# Patient Record
Sex: FEMALE | Race: OTHER | HISPANIC OR LATINO | URBAN - METROPOLITAN AREA
[De-identification: names, ages, dates, MRNs, and addresses within clinical notes are randomized per-mention and may not be internally consistent; named-entity substitution may affect disease eponyms.]

---

## 2023-08-14 RX ORDER — METRONIDAZOLE 500 MG
1 TABLET ORAL
Refills: 0 | DISCHARGE
Start: 2023-08-14

## 2023-08-14 RX ORDER — CIPROFLOXACIN LACTATE 400MG/40ML
1 VIAL (ML) INTRAVENOUS
Refills: 0 | DISCHARGE
Start: 2023-08-14

## 2023-08-15 ENCOUNTER — INPATIENT (INPATIENT)
Facility: HOSPITAL | Age: 58
LOS: 2 days | Discharge: HOME | DRG: 442 | End: 2023-08-18
Attending: INTERNAL MEDICINE | Admitting: INTERNAL MEDICINE
Payer: COMMERCIAL

## 2023-08-15 VITALS
HEART RATE: 91 BPM | RESPIRATION RATE: 18 BRPM | SYSTOLIC BLOOD PRESSURE: 124 MMHG | HEIGHT: 62 IN | WEIGHT: 179.9 LBS | DIASTOLIC BLOOD PRESSURE: 79 MMHG | TEMPERATURE: 98 F | OXYGEN SATURATION: 99 %

## 2023-08-15 DIAGNOSIS — K74.60 UNSPECIFIED CIRRHOSIS OF LIVER: ICD-10-CM

## 2023-08-15 LAB
A1AT SERPL-MCNC: 191 MG/DL — SIGNIFICANT CHANGE UP (ref 90–200)
ALBUMIN SERPL ELPH-MCNC: 3.3 G/DL — SIGNIFICANT CHANGE UP (ref 3.3–5)
ALP SERPL-CCNC: 109 U/L — SIGNIFICANT CHANGE UP (ref 40–120)
ALT FLD-CCNC: 40 U/L — SIGNIFICANT CHANGE UP (ref 10–45)
ANION GAP SERPL CALC-SCNC: 12 MMOL/L — SIGNIFICANT CHANGE UP (ref 5–17)
AST SERPL-CCNC: 56 U/L — HIGH (ref 10–40)
B-OH-BUTYR SERPL-SCNC: 0.1 MMOL/L — SIGNIFICANT CHANGE UP
BASE EXCESS BLDV CALC-SCNC: -5.4 MMOL/L — LOW (ref -2–3)
BASOPHILS # BLD AUTO: 0 K/UL — SIGNIFICANT CHANGE UP (ref 0–0.2)
BASOPHILS NFR BLD AUTO: 0 % — SIGNIFICANT CHANGE UP (ref 0–2)
BILIRUB SERPL-MCNC: 0.9 MG/DL — SIGNIFICANT CHANGE UP (ref 0.2–1.2)
BUN SERPL-MCNC: <4 MG/DL — LOW (ref 7–23)
CA-I SERPL-SCNC: 1.1 MMOL/L — LOW (ref 1.15–1.33)
CALCIUM SERPL-MCNC: 7.7 MG/DL — LOW (ref 8.4–10.5)
CHLORIDE BLDV-SCNC: 105 MMOL/L — SIGNIFICANT CHANGE UP (ref 96–108)
CHLORIDE SERPL-SCNC: 107 MMOL/L — SIGNIFICANT CHANGE UP (ref 96–108)
CHOLEST SERPL-MCNC: 79 MG/DL — SIGNIFICANT CHANGE UP
CK SERPL-CCNC: 197 U/L — HIGH (ref 25–170)
CO2 BLDV-SCNC: 21 MMOL/L — LOW (ref 22–26)
CO2 SERPL-SCNC: 19 MMOL/L — LOW (ref 22–31)
CREAT SERPL-MCNC: 0.43 MG/DL — LOW (ref 0.5–1.3)
EGFR: 111 ML/MIN/1.73M2 — SIGNIFICANT CHANGE UP
EOSINOPHIL # BLD AUTO: 0.07 K/UL — SIGNIFICANT CHANGE UP (ref 0–0.5)
EOSINOPHIL NFR BLD AUTO: 2 % — SIGNIFICANT CHANGE UP (ref 0–6)
FERRITIN SERPL-MCNC: 102 NG/ML — SIGNIFICANT CHANGE UP (ref 13–330)
GAS PNL BLDV: 135 MMOL/L — LOW (ref 136–145)
GAS PNL BLDV: SIGNIFICANT CHANGE UP
GAS PNL BLDV: SIGNIFICANT CHANGE UP
GLUCOSE BLDV-MCNC: 132 MG/DL — HIGH (ref 70–99)
GLUCOSE SERPL-MCNC: 136 MG/DL — HIGH (ref 70–99)
HAV IGM SER-ACNC: SIGNIFICANT CHANGE UP
HBV CORE IGM SER-ACNC: SIGNIFICANT CHANGE UP
HBV SURFACE AG SER-ACNC: SIGNIFICANT CHANGE UP
HCO3 BLDV-SCNC: 20 MMOL/L — LOW (ref 22–29)
HCT VFR BLD CALC: 37.4 % — SIGNIFICANT CHANGE UP (ref 34.5–45)
HCT VFR BLDA CALC: 41 % — SIGNIFICANT CHANGE UP (ref 34.5–46.5)
HCV AB S/CO SERPL IA: 0.19 S/CO — SIGNIFICANT CHANGE UP (ref 0–0.99)
HCV AB SERPL-IMP: SIGNIFICANT CHANGE UP
HDLC SERPL-MCNC: 38 MG/DL — LOW
HGB BLD CALC-MCNC: 13.6 G/DL — SIGNIFICANT CHANGE UP (ref 11.7–16.1)
HGB BLD-MCNC: 12.2 G/DL — SIGNIFICANT CHANGE UP (ref 11.5–15.5)
IRON SATN MFR SERPL: 26 % — SIGNIFICANT CHANGE UP (ref 14–50)
IRON SATN MFR SERPL: 77 UG/DL — SIGNIFICANT CHANGE UP (ref 30–160)
LACTATE BLDV-MCNC: 2.9 MMOL/L — HIGH (ref 0.5–2)
LACTATE SERPL-SCNC: 1.5 MMOL/L — SIGNIFICANT CHANGE UP (ref 0.5–2)
LIDOCAIN IGE QN: 28 U/L — SIGNIFICANT CHANGE UP (ref 7–60)
LIPID PNL WITH DIRECT LDL SERPL: 27 MG/DL — SIGNIFICANT CHANGE UP
LYMPHOCYTES # BLD AUTO: 1.41 K/UL — SIGNIFICANT CHANGE UP (ref 1–3.3)
LYMPHOCYTES # BLD AUTO: 43 % — SIGNIFICANT CHANGE UP (ref 13–44)
MANUAL SMEAR VERIFICATION: SIGNIFICANT CHANGE UP
MCHC RBC-ENTMCNC: 29 PG — SIGNIFICANT CHANGE UP (ref 27–34)
MCHC RBC-ENTMCNC: 32.6 GM/DL — SIGNIFICANT CHANGE UP (ref 32–36)
MCV RBC AUTO: 89 FL — SIGNIFICANT CHANGE UP (ref 80–100)
MONOCYTES # BLD AUTO: 0.23 K/UL — SIGNIFICANT CHANGE UP (ref 0–0.9)
MONOCYTES NFR BLD AUTO: 7 % — SIGNIFICANT CHANGE UP (ref 2–14)
NEUTROPHILS # BLD AUTO: 1.58 K/UL — LOW (ref 1.8–7.4)
NEUTROPHILS NFR BLD AUTO: 47 % — SIGNIFICANT CHANGE UP (ref 43–77)
NEUTS BAND # BLD: 1 % — SIGNIFICANT CHANGE UP (ref 0–8)
NON HDL CHOLESTEROL: 40 MG/DL — SIGNIFICANT CHANGE UP
NRBC # BLD: 0 /100 — SIGNIFICANT CHANGE UP (ref 0–0)
PCO2 BLDV: 38 MMHG — LOW (ref 39–42)
PH BLDV: 7.33 — SIGNIFICANT CHANGE UP (ref 7.32–7.43)
PLAT MORPH BLD: NORMAL — SIGNIFICANT CHANGE UP
PLATELET # BLD AUTO: 56 K/UL — LOW (ref 150–400)
PO2 BLDV: 21 MMHG — LOW (ref 25–45)
POTASSIUM BLDV-SCNC: 3.1 MMOL/L — LOW (ref 3.5–5.1)
POTASSIUM SERPL-MCNC: 3.1 MMOL/L — LOW (ref 3.5–5.3)
POTASSIUM SERPL-SCNC: 3.1 MMOL/L — LOW (ref 3.5–5.3)
PROT SERPL-MCNC: 6.1 G/DL — SIGNIFICANT CHANGE UP (ref 6–8.3)
PROT SERPL-MCNC: 6.1 G/DL — SIGNIFICANT CHANGE UP (ref 6–8.3)
PROT SERPL-MCNC: 6.3 G/DL — SIGNIFICANT CHANGE UP (ref 6–8.3)
RBC # BLD: 4.2 M/UL — SIGNIFICANT CHANGE UP (ref 3.8–5.2)
RBC # FLD: 13.7 % — SIGNIFICANT CHANGE UP (ref 10.3–14.5)
RBC BLD AUTO: SIGNIFICANT CHANGE UP
SAO2 % BLDV: 45.4 % — LOW (ref 67–88)
SODIUM SERPL-SCNC: 138 MMOL/L — SIGNIFICANT CHANGE UP (ref 135–145)
TIBC SERPL-MCNC: 291 UG/DL — SIGNIFICANT CHANGE UP (ref 220–430)
TRANSFERRIN SERPL-MCNC: 226 MG/DL — SIGNIFICANT CHANGE UP (ref 200–360)
TRIGL SERPL-MCNC: 55 MG/DL — SIGNIFICANT CHANGE UP
UIBC SERPL-MCNC: 215 UG/DL — SIGNIFICANT CHANGE UP (ref 110–370)
WBC # BLD: 3.29 K/UL — LOW (ref 3.8–10.5)
WBC # FLD AUTO: 3.29 K/UL — LOW (ref 3.8–10.5)

## 2023-08-15 PROCEDURE — 99285 EMERGENCY DEPT VISIT HI MDM: CPT

## 2023-08-15 PROCEDURE — 71045 X-RAY EXAM CHEST 1 VIEW: CPT | Mod: 26

## 2023-08-15 RX ORDER — SODIUM CHLORIDE 9 MG/ML
1000 INJECTION INTRAMUSCULAR; INTRAVENOUS; SUBCUTANEOUS ONCE
Refills: 0 | Status: COMPLETED | OUTPATIENT
Start: 2023-08-15 | End: 2023-08-15

## 2023-08-15 RX ORDER — CIPROFLOXACIN LACTATE 400MG/40ML
400 VIAL (ML) INTRAVENOUS EVERY 12 HOURS
Refills: 0 | Status: DISCONTINUED | OUTPATIENT
Start: 2023-08-15 | End: 2023-08-18

## 2023-08-15 RX ORDER — DENOSUMAB 60 MG/ML
60 INJECTION SUBCUTANEOUS
Refills: 0 | DISCHARGE

## 2023-08-15 RX ORDER — ALBUTEROL 90 UG/1
2 AEROSOL, METERED ORAL
Refills: 0 | DISCHARGE

## 2023-08-15 RX ORDER — POTASSIUM CHLORIDE 20 MEQ
40 PACKET (EA) ORAL ONCE
Refills: 0 | Status: COMPLETED | OUTPATIENT
Start: 2023-08-15 | End: 2023-08-15

## 2023-08-15 RX ORDER — METRONIDAZOLE 500 MG
500 TABLET ORAL EVERY 8 HOURS
Refills: 0 | Status: DISCONTINUED | OUTPATIENT
Start: 2023-08-15 | End: 2023-08-18

## 2023-08-15 RX ORDER — DULAGLUTIDE 4.5 MG/.5ML
1.5 INJECTION, SOLUTION SUBCUTANEOUS
Refills: 0 | DISCHARGE

## 2023-08-15 RX ORDER — LOPERAMIDE HCL 2 MG
2 TABLET ORAL ONCE
Refills: 0 | Status: COMPLETED | OUTPATIENT
Start: 2023-08-15 | End: 2023-08-15

## 2023-08-15 RX ORDER — METFORMIN HYDROCHLORIDE 850 MG/1
1 TABLET ORAL
Refills: 0 | DISCHARGE

## 2023-08-15 RX ADMIN — Medication 2 MILLIGRAM(S): at 13:57

## 2023-08-15 RX ADMIN — Medication 40 MILLIEQUIVALENT(S): at 15:35

## 2023-08-15 RX ADMIN — SODIUM CHLORIDE 1000 MILLILITER(S): 9 INJECTION INTRAMUSCULAR; INTRAVENOUS; SUBCUTANEOUS at 13:57

## 2023-08-15 RX ADMIN — Medication 100 MILLIGRAM(S): at 23:00

## 2023-08-15 NOTE — ED ADULT NURSE REASSESSMENT NOTE - NS ED NURSE REASSESS COMMENT FT1
Unable to obtain peripheral IV, MD to place US guided IV Unable to obtain peripheral IV, US team to place US guided IV

## 2023-08-15 NOTE — ED PROVIDER NOTE - OBJECTIVE STATEMENT
58-year-old female history of diabetes presenting to the emergency department with 4 days of continuous watery diarrhea.  On day 2 of her symptoms patient was seen at an outside hospital was brought into observation for dehydration and electrolyte abnormalities at that time had a CT of the abdomen pelvis performed showing diffuse colitis and was started on Cipro Flagyl.  Patient is only been on Cipro Flagyl for 24 hours without improvement of her symptoms.  Was seen by gastrointestinal specialist yesterday that recommended that she get a myriad of blood work done which she has not performed yet.  Patient presenting to our hospital today due to persistent symptoms reports 8 bowel movements today that were all diarrhea nonbloody nonbilious.  No fevers no chills no nausea no vomiting.  No chest pain or shortness of breath.  Nothing makes her complaints better or worse has not taking anything in particular to stop the diarrhea. 58-year-old female history of diabetes presenting to the emergency department with 4 days of continuous watery diarrhea.  On day 2 of her symptoms patient was seen at an outside hospital was brought into observation for dehydration and electrolyte abnormalities at that time had a CT of the abdomen pelvis performed showing diffuse colitis and was started on Cipro Flagyl.  Patient is only been on Cipro Flagyl for 24 hours without improvement of her symptoms.  Was seen by gastrointestinal specialist yesterday that recommended that she get a myriad of blood work done which she has not performed yet.  Patient presenting to our hospital today due to persistent symptoms reports 8 bowel movements today that were all diarrhea nonbloody nonbilious.  No fevers no chills no nausea no vomiting.  No chest pain or shortness of breath.  Nothing makes her complaints better or worse has not taking anything in particular to stop the diarrhea. Of note, mother had cirrhosis. Pt with negative C.Diff, negative COVID at OSH.

## 2023-08-15 NOTE — ED ADULT TRIAGE NOTE - AS PAIN REST
CERTIFICATE OF RETURN TO WORK        Re: Kehinde Parish        This is to certify that Kehinde Parish has been under my care for injury/illness.      RESTRICTIONS:  Seen today.  Off work from Tuesday 6/20 until reevaluated Friday 6/23/17;      REMARKS:   Return sooner if worse or no improvement.         SIGNATURE:___________________________________________,   6/21/2017                                               ESTHER Hutchins.         [facility]  [address]  [place] [address]  Phone [phone]      
0 (no pain/absence of nonverbal indicators of pain)

## 2023-08-15 NOTE — ED PROVIDER NOTE - CLINICAL SUMMARY MEDICAL DECISION MAKING FREE TEXT BOX
On physical exam patient is well-appearing with dry mucous membranes and a soft nontender abdomen that is slightly distended.  No other findings at this time.  Patient accompanied by paperwork from her GI doctor asking for particular lab work, space has the CT from over the weekend when she got her abdomen scanned which reads as having severe portal hypertension cirrhosis and diffuse colitis.  We will check basic labs we will send esoteric labs from the gastrointestinal specialist we will hydrate give something to help with diarrhea and reassess with plan for likely admission to be seen by hepatology to assess for the cirrhosis and portal hypertension. On physical exam patient is well-appearing with dry mucous membranes and a soft nontender abdomen that is slightly distended.  No other findings at this time.  Patient accompanied by paperwork from her GI doctor asking for particular lab work, space has the CT from over the weekend when she got her abdomen scanned which reads as having severe portal hypertension cirrhosis and diffuse colitis.  We will check basic labs we will send esoteric labs from the gastrointestinal specialist we will hydrate give something to help with diarrhea and reassess with plan for likely admission to be seen by hepatology to assess for the cirrhosis and portal hypertension.    Meliza Orr, Attending Physician: DDx includes but not limited to: ascites, cirrhosis, liver failure, no clinical evidence of SBP. 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

## 2023-08-15 NOTE — H&P ADULT - NSHPPHYSICALEXAM_GEN_ALL_CORE
Vital Signs Last 24 Hrs  T(C): 37.3 (15 Aug 2023 20:14), Max: 37.3 (15 Aug 2023 20:14)  T(F): 99.2 (15 Aug 2023 20:14), Max: 99.2 (15 Aug 2023 20:14)  HR: 89 (15 Aug 2023 20:14) (78 - 92)  BP: 102/69 (15 Aug 2023 20:14) (102/69 - 135/81)  BP(mean): --  RR: 17 (15 Aug 2023 20:14) (16 - 18)  SpO2: 97% (15 Aug 2023 20:14) (97% - 99%)    Parameters below as of 15 Aug 2023 20:14  Patient On (Oxygen Delivery Method): room air    PHYSICAL EXAM:  GENERAL: NAD, well-developed  HEAD:  Atraumatic, Normocephalic  EYES: EOMI, PERRLA, conjunctiva and sclera clear  NECK: Supple, No JVD  CHEST/LUNG: Clear to auscultation bilaterally; No wheeze  HEART: Regular rate and rhythm; No murmurs, rubs, or gallops  ABDOMEN: Soft, Nontender, Nondistended; Bowel sounds present  EXTREMITIES:  2+ Peripheral Pulses, No clubbing, cyanosis, or edema  PSYCH: AAOx3  NEUROLOGY: non-focal  SKIN: No rashes or lesions

## 2023-08-15 NOTE — H&P ADULT - HISTORY OF PRESENT ILLNESS
58-year-old female history of diabetes presenting to the emergency department with 4 days of continuous watery diarrhea.  On day 2 of her symptoms patient was seen at an outside hospital was brought into observation for dehydration and electrolyte abnormalities at that time had a CT of the abdomen pelvis performed showing diffuse colitis and was started on Cipro Flagyl.  Patient is only been on Cipro Flagyl for 24 hours without improvement of her symptoms.  Was seen by gastrointestinal specialist yesterday that recommended that she get a myriad of blood work done which she has not performed yet.  Patient presenting to our hospital today due to persistent symptoms reports 8 bowel movements today that were all diarrhea nonbloody nonbilious.  No fevers no chills no nausea no vomiting.  No chest pain or shortness of breath.  Nothing makes her complaints better or worse has not taking anything in particular to stop the diarrhea. Of note, mother had cirrhosis. Pt with negative C.Diff, negative COVID at OSH.

## 2023-08-15 NOTE — ED ADULT NURSE NOTE - NSFALLUNIVINTERV_ED_ALL_ED
Bed/Stretcher in lowest position, wheels locked, appropriate side rails in place/Call bell, personal items and telephone in reach/Instruct patient to call for assistance before getting out of bed/chair/stretcher/Non-slip footwear applied when patient is off stretcher/[place] to call system/Physically safe environment - no spills, clutter or unnecessary equipment/Purposeful proactive rounding/Room/bathroom lighting operational, light cord in reach

## 2023-08-15 NOTE — ED ADULT NURSE NOTE - OBJECTIVE STATEMENT
57 yo female with pmh cirrhosis, diabetes presents to ED 59 yo female with pmh cirrhosis, diabetes presents to ED c/o abdominal cramping and diarrhea x 4 days. Pt was seen at OSH on Saturday, and started on abx for colitis yesterday. Pt reports she f/u with GI yesterday and was advised to get additional blood work; came to ED today for continued diarrhea, endorsing "8 episodes" today. Pt also reports "I was told I have ascites when I was at the hospital over the weekend." Pt denies bloody/bilious diarrhea, fevers/chills, n/v, cp, sob, urinary symptoms. Pt a&ox3, breathing spontaneous and unlabored, moving all extremities and following commands, skin warm dry and appropriate color, abdomen mildly distended. Pt safety measures in place and comfort provided.

## 2023-08-15 NOTE — ED PROVIDER NOTE - NSICDXFAMILYHX_GEN_ALL_CORE_FT
FAMILY HISTORY:  Father  Still living? No  Family history of kidney cancer, Age at diagnosis: Age Unknown    Mother  Still living? Yes, Estimated age: Age Unknown  Family history of diabetes mellitus, Age at diagnosis: Age Unknown  Family history of hypertension, Age at diagnosis: Age Unknown  Family history of liver disease, Age at diagnosis: Age Unknown

## 2023-08-15 NOTE — H&P ADULT - ASSESSMENT
58 female h/o dm, here with c/o diarrhea. reported colitis on CT from OSH.    diarrhea  colitis  check stool pcr, cdiff  cipro/flagyl iv   clears diet  gi consulted  consider repeat CT    thrombocytopenia  unknown etiology  chronic as per pt  monitor    dm  iss  monitor fs    hypokalemia  supplemented  check repeat bmp        Advanced care planning was discussed with patient and family.  Advanced care planning forms were reviewed and discussed as appropriate.  Differential diagnosis and plan of care discussed with patient after the evaluation.   Pain assessed and judicious use of narcotics when appropriate was discussed.  Importance of Fall prevention discussed.  Counseling on Smoking and Alcohol cessation was offered when appropriate.  Counseling on Diet, exercise, and medication compliance was done.       Approx 60 minutes spent.

## 2023-08-15 NOTE — ED PROCEDURE NOTE - PROCEDURE ADDITIONAL DETAILS
Emergency Department Focused Ultrasound performed at patient's bedside for placement of ultrasound guided IV. The study was confirmed with blood return and ease of flushing saline.

## 2023-08-16 LAB
ALBUMIN SERPL ELPH-MCNC: 2.7 G/DL — LOW (ref 3.3–5)
ALP SERPL-CCNC: 94 U/L — SIGNIFICANT CHANGE UP (ref 40–120)
ALT FLD-CCNC: 36 U/L — SIGNIFICANT CHANGE UP (ref 10–45)
ANION GAP SERPL CALC-SCNC: 11 MMOL/L — SIGNIFICANT CHANGE UP (ref 5–17)
APTT BLD: 34.4 SEC — SIGNIFICANT CHANGE UP (ref 24.5–35.6)
AST SERPL-CCNC: 51 U/L — HIGH (ref 10–40)
BILIRUB SERPL-MCNC: 0.8 MG/DL — SIGNIFICANT CHANGE UP (ref 0.2–1.2)
BUN SERPL-MCNC: <4 MG/DL — LOW (ref 7–23)
CALCIUM SERPL-MCNC: 7.3 MG/DL — LOW (ref 8.4–10.5)
CHLORIDE SERPL-SCNC: 109 MMOL/L — HIGH (ref 96–108)
CO2 SERPL-SCNC: 20 MMOL/L — LOW (ref 22–31)
CREAT SERPL-MCNC: 0.38 MG/DL — LOW (ref 0.5–1.3)
EGFR: 115 ML/MIN/1.73M2 — SIGNIFICANT CHANGE UP
EPEC DNA STL QL NAA+PROBE: DETECTED
G LAMBLIA DNA STL QL NAA+NON-PROBE: DETECTED
GI PCR PANEL: DETECTED
GLUCOSE BLDC GLUCOMTR-MCNC: 112 MG/DL — HIGH (ref 70–99)
GLUCOSE BLDC GLUCOMTR-MCNC: 114 MG/DL — HIGH (ref 70–99)
GLUCOSE BLDC GLUCOMTR-MCNC: 121 MG/DL — HIGH (ref 70–99)
GLUCOSE BLDC GLUCOMTR-MCNC: 170 MG/DL — HIGH (ref 70–99)
GLUCOSE SERPL-MCNC: 186 MG/DL — HIGH (ref 70–99)
HCT VFR BLD CALC: 34.9 % — SIGNIFICANT CHANGE UP (ref 34.5–45)
HGB BLD-MCNC: 11.5 G/DL — SIGNIFICANT CHANGE UP (ref 11.5–15.5)
INR BLD: 1.65 RATIO — HIGH (ref 0.85–1.18)
MCHC RBC-ENTMCNC: 29 PG — SIGNIFICANT CHANGE UP (ref 27–34)
MCHC RBC-ENTMCNC: 33 GM/DL — SIGNIFICANT CHANGE UP (ref 32–36)
MCV RBC AUTO: 87.9 FL — SIGNIFICANT CHANGE UP (ref 80–100)
MITOCHONDRIA AB SER-ACNC: SIGNIFICANT CHANGE UP
NRBC # BLD: 0 /100 WBCS — SIGNIFICANT CHANGE UP (ref 0–0)
PLATELET # BLD AUTO: 55 K/UL — LOW (ref 150–400)
POTASSIUM SERPL-MCNC: 3.5 MMOL/L — SIGNIFICANT CHANGE UP (ref 3.5–5.3)
POTASSIUM SERPL-SCNC: 3.5 MMOL/L — SIGNIFICANT CHANGE UP (ref 3.5–5.3)
PROT SERPL-MCNC: 5.5 G/DL — LOW (ref 6–8.3)
PROTHROM AB SERPL-ACNC: 17.1 SEC — HIGH (ref 9.5–13)
RBC # BLD: 3.97 M/UL — SIGNIFICANT CHANGE UP (ref 3.8–5.2)
RBC # FLD: 13.8 % — SIGNIFICANT CHANGE UP (ref 10.3–14.5)
SMOOTH MUSCLE AB SER-ACNC: SIGNIFICANT CHANGE UP
SODIUM SERPL-SCNC: 140 MMOL/L — SIGNIFICANT CHANGE UP (ref 135–145)
WBC # BLD: 2.8 K/UL — LOW (ref 3.8–10.5)
WBC # FLD AUTO: 2.8 K/UL — LOW (ref 3.8–10.5)

## 2023-08-16 PROCEDURE — 99222 1ST HOSP IP/OBS MODERATE 55: CPT

## 2023-08-16 PROCEDURE — 76705 ECHO EXAM OF ABDOMEN: CPT | Mod: 26

## 2023-08-16 RX ORDER — FUROSEMIDE 40 MG
40 TABLET ORAL DAILY
Refills: 0 | Status: DISCONTINUED | OUTPATIENT
Start: 2023-08-16 | End: 2023-08-18

## 2023-08-16 RX ORDER — DEXTROSE 50 % IN WATER 50 %
25 SYRINGE (ML) INTRAVENOUS ONCE
Refills: 0 | Status: DISCONTINUED | OUTPATIENT
Start: 2023-08-16 | End: 2023-08-18

## 2023-08-16 RX ORDER — DEXTROSE 50 % IN WATER 50 %
12.5 SYRINGE (ML) INTRAVENOUS ONCE
Refills: 0 | Status: DISCONTINUED | OUTPATIENT
Start: 2023-08-16 | End: 2023-08-18

## 2023-08-16 RX ORDER — DEXTROSE 50 % IN WATER 50 %
15 SYRINGE (ML) INTRAVENOUS ONCE
Refills: 0 | Status: DISCONTINUED | OUTPATIENT
Start: 2023-08-16 | End: 2023-08-18

## 2023-08-16 RX ORDER — INSULIN LISPRO 100/ML
VIAL (ML) SUBCUTANEOUS AT BEDTIME
Refills: 0 | Status: DISCONTINUED | OUTPATIENT
Start: 2023-08-16 | End: 2023-08-18

## 2023-08-16 RX ORDER — GLUCAGON INJECTION, SOLUTION 0.5 MG/.1ML
1 INJECTION, SOLUTION SUBCUTANEOUS ONCE
Refills: 0 | Status: DISCONTINUED | OUTPATIENT
Start: 2023-08-16 | End: 2023-08-18

## 2023-08-16 RX ORDER — SODIUM CHLORIDE 9 MG/ML
1000 INJECTION, SOLUTION INTRAVENOUS
Refills: 0 | Status: DISCONTINUED | OUTPATIENT
Start: 2023-08-16 | End: 2023-08-18

## 2023-08-16 RX ORDER — SPIRONOLACTONE 25 MG/1
100 TABLET, FILM COATED ORAL DAILY
Refills: 0 | Status: DISCONTINUED | OUTPATIENT
Start: 2023-08-16 | End: 2023-08-18

## 2023-08-16 RX ORDER — INSULIN LISPRO 100/ML
VIAL (ML) SUBCUTANEOUS
Refills: 0 | Status: DISCONTINUED | OUTPATIENT
Start: 2023-08-16 | End: 2023-08-18

## 2023-08-16 RX ADMIN — Medication 100 MILLIGRAM(S): at 21:28

## 2023-08-16 RX ADMIN — SPIRONOLACTONE 100 MILLIGRAM(S): 25 TABLET, FILM COATED ORAL at 18:53

## 2023-08-16 RX ADMIN — Medication 200 MILLIGRAM(S): at 07:01

## 2023-08-16 RX ADMIN — Medication 40 MILLIGRAM(S): at 18:53

## 2023-08-16 RX ADMIN — Medication 100 MILLIGRAM(S): at 05:52

## 2023-08-16 RX ADMIN — Medication 1: at 12:03

## 2023-08-16 RX ADMIN — Medication 200 MILLIGRAM(S): at 18:07

## 2023-08-16 RX ADMIN — Medication 100 MILLIGRAM(S): at 14:38

## 2023-08-16 NOTE — CONSULT NOTE ADULT - ASSESSMENT
59yo w/ PMhx DMII presenting w/ watery diarrhea and ascites, most likely 2/2 MADDOX cirrhosis.    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 Iron studies unremarkable, Alpha 1 antitrypsin wnl, hepatitis panel wnl.   V: e/o large ascites on exam   I: Needs tap to r/o SBP  B: Needs screening EGD prior to d/c  E: No e/o HE on exam  S: F/u outpt    #diarrhea  based on hx, suspect secretory (nightime sxs). Reporteldy had stool studies outpt but unclear if pcr done. Improving w/ imodium    Recommendations:  -Please obtain abdominal U/S w/ doppler to r/o PVT and evaluate for ascites  -Please perform paracentesis- please send fluid cell count, fluid cytology, fluid albumin and fluid protein   -Please send SY, anti-SM Ab, Anti-LKM, immunoglobulin panel  -Please start on aldactone 100mg daily and lasix 40mg daily   -Please stop outpt valsartan  -Monitor electrolytes   -Please put on 2g Na restriction  -Would resend GI PCR    Hepatology will continue to follow    Note incomplete until finalized by attending signature/attestation.    Marjan Maldonado  GI/Hepatology Fellow PGY5    NON-URGENT CONSULTS:  Please email [email or web address]  AT NIGHT AND ON WEEKENDS:  Available on Microsoft Teams  [phone] (Long Range Pager)    After 5pm, please contact the on-call GI fellow. [phone]   59yo w/ PMhx DMII presenting w/ watery diarrhea and ascites, most likely 2/2 MADDOX cirrhosis.    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 Iron studies unremarkable, Alpha 1 antitrypsin wnl, hepatitis panel wnl.   V: e/o large ascites on exam   I: Needs tap to r/o SBP  B: Needs screening EGD prior to d/c  E: No e/o HE on exam  S: F/u outpt    #diarrhea  based on hx, suspect secretory (nightime sxs). Reporteldy had stool studies outpt but unclear if pcr done. Improving w/ imodium    Recommendations:  -Please obtain abdominal U/S w/ doppler to r/o PVT and evaluate for ascites  -Please perform paracentesis- please send fluid cell count, fluid cytology, fluid albumin and fluid protein   -please replete albumin 6-8g/kg for every liter of fluid removed after 5L  -Please send SY, anti-SM Ab, Anti-LKM, immunoglobulin panel  -Please start on aldactone 100mg daily and lasix 40mg daily   -Please stop outpt valsartan  -Monitor electrolytes   -Please put on 2g Na restriction  -Would resend GI PCR    Hepatology will continue to follow    Note incomplete until finalized by attending signature/attestation.    Marjan Maldonado  GI/Hepatology Fellow PGY5    NON-URGENT CONSULTS:  Please email [email or web address] OR [email or web address]  AT NIGHT AND ON WEEKENDS:  Available on [employer] Teams  [phone] (Long Range Pager)    After 5pm, please contact the on-call GI fellow. [phone]   59yo w/ PMhx DMII presenting w/ watery diarrhea and ascites, most likely 2/2 MADDOX cirrhosis.    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 Iron studies unremarkable, Alpha 1 antitrypsin wnl, hepatitis panel wnl.   V: e/o large ascites on exam   I: Needs tap to r/o SBP  B: Needs screening EGD prior to d/c  E: No e/o HE on exam  S: F/u outpt    #diarrhea  based on hx, suspect secretory (nightime sxs). Reporteldy had stool studies outpt but unclear if pcr done. Improving w/ imodium    Recommendations:  -Please obtain abdominal U/S w/ doppler to r/o PVT and evaluate for ascites  -Please perform paracentesis- please send fluid cell count, fluid cytology, fluid albumin and fluid protein   -please replete albumin 6-8g/L of fluid removed after 5L   -Please send SY, anti-SM Ab, Anti-LKM, immunoglobulin panel  -Please start on aldactone 100mg daily and lasix 40mg daily   -Please stop outpt valsartan  -Monitor electrolytes   -Please put on 2g Na restriction  -Would resend GI PCR    Hepatology will continue to follow    Note incomplete until finalized by attending signature/attestation.    Marjan Maldonado  GI/Hepatology Fellow PGY5    NON-URGENT CONSULTS:  Please email [email or web address]  AT NIGHT AND ON WEEKENDS:  Available on [employer] Teams  [phone] (Long Range Pager)    After 5pm, please contact the on-call GI fellow. [phone]

## 2023-08-16 NOTE — CHART NOTE - NSCHARTNOTEFT_GEN_A_CORE
IR consulted for diagnostic Paracentesis. Will plan for Paracentesis tomorrow (8/17/23). Please place IR procedure order

## 2023-08-16 NOTE — CONSULT NOTE ADULT - ATTENDING COMMENTS
59 yo F  with obesity, DM2, and a family history of cryptogenic (likely steatohepatitis) cirrhosis and HCC (mother) and with a known personal history for several years of compensated cirrhosis attributed to metabolic steatohepatitis, with recent travel to [place] and subsequent development of acute diarrhea now found to be positive for EPEC and Giardia lamblia on GI PCR (8/16), admitted with recent onset of large volume ascites as well as mild peripheral edema. Outside CT from 8/12 personally reviewed and portomesenteric venous system appears patent, but recommend complete abdominal US with Doppler for re-evaluation here. Awaiting diagnostic and therapeutic paracentesis to be done ASAP to rule out peritonitis (especially given her GI infection) as well as to confirm that ascitic fluid chemistries are consistent with ascites due to sinusoidal portal hypertension from cirrhosis. Given improved diarrhea (though would hold off on any further Imodium), recommend to start diuretics today as above to decrease reaccumulation of ascites. Current MELD 3.0 of 13. We discussed with her and her daughter at bedside as well as with her son (who is a neonatologist) by phone her plan of care, including need for salt restricted diet. We also discussed potential consideration of elective TIPS and/or liver transplantation in the future if she develops refractory ascites not alleviated with diuretics.    Please don't hesitate to call with any questions or concerns.    Baudilio Christine M.D., Ph.D.  Transplant Hepatology

## 2023-08-16 NOTE — PROGRESS NOTE ADULT - SUBJECTIVE AND OBJECTIVE BOX
ARCHIE REZA  58y Female  MRN:[id number]    Patient is a 58y old  Female who presents with a chief complaint of   HPI:  58-year-old female history of diabetes presenting to the emergency department with 4 days of continuous watery diarrhea.  On day 2 of her symptoms patient was seen at an outside hospital was brought into observation for dehydration and electrolyte abnormalities at that time had a CT of the abdomen pelvis performed showing diffuse colitis and was started on Cipro Flagyl.  Patient is only been on Cipro Flagyl for 24 hours without improvement of her symptoms.  Was seen by gastrointestinal specialist yesterday that recommended that she get a myriad of blood work done which she has not performed yet.  Patient presenting to our hospital today due to persistent symptoms reports 8 bowel movements today that were all diarrhea nonbloody nonbilious.  No fevers no chills no nausea no vomiting.  No chest pain or shortness of breath.  Nothing makes her complaints better or worse has not taking anything in particular to stop the diarrhea. Of note, mother had cirrhosis. Pt with negative C.Diff, negative COVID at OSH. (15 Aug 2023 20:26)      Patient seen and evaluated at bedside. No acute events overnight except as noted.    Interval HPI: no acute events o/n     PAST MEDICAL & SURGICAL HISTORY:  Diabetes      Asthma      S/P appendectomy        S/P cholecystectomy        S/P   ,          REVIEW OF SYSTEMS:  as per      VITALS:  Vital Signs Last 24 Hrs  T(C): 36.6 (16 Aug 2023 04:16), Max: 37.3 (15 Aug 2023 20:14)  T(F): 97.9 (16 Aug 2023 04:16), Max: 99.2 (15 Aug 2023 20:14)  HR: 80 (16 Aug 2023 04:16) (78 - 92)  BP: 95/62 (16 Aug 2023 04:16) (95/62 - 135/81)  BP(mean): --  RR: 18 (16 Aug 2023 04:16) (16 - 18)  SpO2: 100% (16 Aug 2023 04:16) (97% - 100%)    Parameters below as of 16 Aug 2023 04:16  Patient On (Oxygen Delivery Method): room air      CAPILLARY BLOOD GLUCOSE      POCT Blood Glucose.: 170 mg/dL (16 Aug 2023 11:32)  POCT Blood Glucose.: 112 mg/dL (16 Aug 2023 07:38)    I&O's Summary      PHYSICAL EXAM:  GENERAL: NAD, well-developed  HEAD:  Atraumatic, Normocephalic  EYES: EOMI, PERRLA, conjunctiva and sclera clear  NECK: Supple, No JVD  CHEST/LUNG: Clear to auscultation bilaterally; No wheeze  HEART: S1, S2; No murmurs, rubs, or gallops  ABDOMEN: Soft, Nontender, +distended; Bowel sounds present  EXTREMITIES:  2+ Peripheral Pulses, No clubbing, cyanosis, or edema  PSYCH: Normal affect  NEUROLOGY: AAOX3; non-focal  SKIN: No rashes or lesions    Consultant(s) Notes Reviewed:  [x ] YES  [ ] NO  Care Discussed with Consultants/Other Providers [ x] YES  [ ] NO    MEDS:  MEDICATIONS  (STANDING):  ciprofloxacin   IVPB 400 milliGRAM(s) IV Intermittent every 12 hours  dextrose 5%. 1000 milliLiter(s) (50 mL/Hr) IV Continuous <Continuous>  dextrose 5%. 1000 milliLiter(s) (100 mL/Hr) IV Continuous <Continuous>  dextrose 50% Injectable 25 Gram(s) IV Push once  dextrose 50% Injectable 12.5 Gram(s) IV Push once  dextrose 50% Injectable 25 Gram(s) IV Push once  glucagon  Injectable 1 milliGRAM(s) IntraMuscular once  insulin lispro (ADMELOG) corrective regimen sliding scale   SubCutaneous three times a day before meals  insulin lispro (ADMELOG) corrective regimen sliding scale   SubCutaneous at bedtime  metroNIDAZOLE  IVPB 500 milliGRAM(s) IV Intermittent every 8 hours    MEDICATIONS  (PRN):  dextrose Oral Gel 15 Gram(s) Oral once PRN Blood Glucose LESS THAN 70 milliGRAM(s)/deciliter    ALLERGIES:  latex (Rash)  eggplant, sesame seeds (Stomach Upset; Anaphylaxis)  Augmentin ES-600 (Nausea; Diarrhea)      LABS:                        11.5   2.80  )-----------( 55       ( 16 Aug 2023 10:41 )             34.9     08-16    140  |  109<H>  |  <4<L>  ----------------------------<  186<H>  3.5   |  20<L>  |  0.38<L>    Ca    7.3<L>      16 Aug 2023 10:41    TPro  5.5<L>  /  Alb  2.7<L>  /  TBili  0.8  /  DBili  x   /  AST  51<H>  /  ALT  36  /  AlkPhos  94  08-16      CARDIAC MARKERS ( 15 Aug 2023 14:11 )  x     / x     / 197 U/L / x     / x          LIVER FUNCTIONS - ( 16 Aug 2023 10:41 )  Alb: 2.7 g/dL / Pro: 5.5 g/dL / ALK PHOS: 94 U/L / ALT: 36 U/L / AST: 51 U/L / GGT: x           Urinalysis Basic - ( 16 Aug 2023 10:41 )    Color: x / Appearance: x / SG: x / pH: x  Gluc: 186 mg/dL / Ketone: x  / Bili: x / Urobili: x   Blood: x / Protein: x / Nitrite: x   Leuk Esterase: x / RBC: x / WBC x   Sq Epi: x / Non Sq Epi: x / Bacteria: x

## 2023-08-16 NOTE — CONSULT NOTE ADULT - SUBJECTIVE AND OBJECTIVE BOX
INIITIAL Hepatology CONSULTATION    Patient is a 58y old  Female who presents with a chief complaint of   HPI:  57yo w/ PMhx DMII presenting w/ watery diarrhea and ?new diagnosis of cirrhosis.     Per notes, pt w/ reports of several days of diarrhea. Presented to OSH where she was treated conservatively and had a CT scan that reportedly showed cirrhosis and e/o portal HTN. Saw outpt GI who recommended further w/u. On presentation here still w/ episodes of nonbloody diarrhea.       ASA/NSAIDs/anticoagulation: ***    VS: ***    Labs/Imaging reviewed.  CBC: 3.29>12.2<56 MCV  Hb trend since admission: stable  BUN/Cr: 56/1.58  ALT/AST 56/40    T/Dbili 0.9  Alb: 3.3  INR: None    Iron studies:   Fe: 77  TIBC:291  %Tfr26  Ferritin: 102    Hepatitis serologies:  HBCore IgM: NR  HBSAg: NR  HAV IgM Ab: NR  HCV: NR  alpha1 antitrypsin: WNL    [unfilled] ***do not include MELD if not ESLD/ if only consulted for abnl LTs      Imaging:  CT: No read    ***no EGD/colo in chart      SocHx:  -no tobacco, ETOH, other drug use***    PMH/PSH:  PAST MEDICAL & SURGICAL HISTORY:  Diabetes      Asthma      S/P appendectomy        S/P cholecystectomy  1991      S/P   ,        FH:  FAMILY HISTORY:  Family history of kidney cancer (Father)    Family history of diabetes mellitus (Mother)    Family history of hypertension (Mother)    Family history of liver disease (Mother)        MEDS:  MEDICATIONS  (STANDING):  ciprofloxacin   IVPB 400 milliGRAM(s) IV Intermittent every 12 hours  metroNIDAZOLE  IVPB 500 milliGRAM(s) IV Intermittent every 8 hours    MEDICATIONS  (PRN):    Allergies    latex (Rash)  eggplant, sesame seeds (Stomach Upset; Anaphylaxis)  Augmentin ES-600 (Nausea; Diarrhea)    Intolerances            CONSTITUTIONAL:  No weight loss, fever, chills, weakness or fatigue.  HEENT:  Eyes:  No visual loss, blurred vision, double vision or yellow sclerae. Ears, Nose, Throat:  No hearing loss, sneezing, congestion, runny nose or sore throat.  SKIN:  No rash or itching.  CARDIOVASCULAR:  No chest pain, chest pressure or chest discomfort. No palpitations or edema.  RESPIRATORY:  No shortness of breath, cough or sputum.  GASTROINTESTINAL:  SEE HPI  GENITOURINARY:  No dysuria, hematuria, urinary frequency  NEUROLOGICAL:  No headache, dizziness, syncope, paralysis, ataxia, numbness or tingling in the extremities. No change in bowel or bladder control.  MUSCULOSKELETAL:  No muscle, back pain, joint pain or stiffness.  HEMATOLOGIC:  No anemia, bleeding or bruising.  LYMPHATICS:  No enlarged nodes. No history of splenectomy.  PSYCHIATRIC:  No history of depression or anxiety.  ENDOCRINOLOGIC:  No reports of sweating, cold or heat intolerance. No polyuria or polydipsia.      ______________________________________________________________________  PHYSICAL EXAM:  T(C): 36.6 (23 @ 04:16), Max: 37.3 (08-15-23 @ 20:14)  HR: 80 (23 @ 04:16)  BP: 95/62 (23 @ 04:16)  RR: 18 (23 @ 04:16)  SpO2: 100% (23 @ 04:16)  Wt(kg): --      GEN: NAD, normocephalic  CVS: S1S2+  CHEST: clear to auscultation  ABD: soft , nontender, nondistended, bowel sounds present  EXTR: no cyanosis, no clubbing, no edema  NEURO: Awake and alert; oriented .....  SKIN:  warm;  non icteric    ______________________________________________________________________  LABS:                        12.2   3.29  )-----------( 56       ( 15 Aug 2023 14:11 )             37.4     08-15    138  |  107  |  <4<L>  ----------------------------<  136<H>  3.1<L>   |  19<L>  |  0.43<L>    Ca    7.7<L>      15 Aug 2023 14:11    TPro  6.1  /  Alb  x   /  TBili  x   /  DBili  x   /  AST  x   /  ALT  x   /  AlkPhos  x   08-15    LIVER FUNCTIONS - ( 15 Aug 2023 14:15 )  Alb: x     / Pro: 6.1 g/dL / ALK PHOS: x     / ALT: x     / AST: x     / GGT: x             ____________________________________________    IMAGING:    ______________________________________________________________________          [facility] Hepatology CONSULTATION    Patient is a 58y old  Female who presents with a chief complaint of   HPI:  59yo w/ PMhx DMII presenting w/ watery diarrhea and ascites.    Patient reports that she traveled to [place] ~4 weeks ago. On returning, had 1 day of diarrhea and vomiting which self resolved but was followed by light beiged stools for several weeks. Last week she developed recurrent non bloody watery diarrhea up to 15x/day, including nightime sxs. She works as [employer] so at work had labs checked, electrolytes repleted and stool cultures and c. diff was sent (reportedly neg). Saw outpt GI day before presentation and noted to have ascites so was sent in for further eval.    She reports hx of fatty liver diagnosied ~5 years ago. She reportedly saw GI doctor and had EGD and colonoscopy (per patient, w/ one small ulcer treated w/ PPI) and was placed on ursodiol for hepatomegaly. When she followed up a year later she was told her hepatomegaly was slightly better but she had some signs of cirrhosis on imaging. Since then no issues and has not followed w/ hepatology. Reports she started to have abdominal distension last week that has progressed substantially. No hematochezia, melena. Denies any confusion, lethargy or sleep/wake dysfunction. Denies any hx of alcohol use. Mother reportedly had MADDOX cirrhosis and  from HCC.        Labs/Imaging reviewed.  CBC: 3.29>12.2<56 MCV  Hb trend since admission: stable  BUN/Cr: 56/1.58  ALT/AST 56/40    T/Dbili 0.9  Alb: 3.3  INR: None    Iron studies:   Fe: 77  TIBC:291  %Tfr26  Ferritin: 102    Hepatitis serologies:  HBCore IgM: NR  HBSAg: NR  HAV IgM Ab: NR  HCV: NR  alpha1 antitrypsin: WNL    MELD 14      Imaging:  CT: No read- e/o cirrhosis w/ portal htn    ***no EGD/colo in chart      SocHx:  -no tobacco, ETOH, other drug use    PMH/PSH:  PAST MEDICAL & SURGICAL HISTORY:  Diabetes      Asthma      S/P appendectomy        S/P cholecystectomy  1991      S/P   ,        FH:  FAMILY HISTORY:  Family history of kidney cancer (Father)    Family history of diabetes mellitus (Mother)    Family history of hypertension (Mother)    Family history of liver disease (Mother)        MEDS:  MEDICATIONS  (STANDING):  ciprofloxacin   IVPB 400 milliGRAM(s) IV Intermittent every 12 hours  metroNIDAZOLE  IVPB 500 milliGRAM(s) IV Intermittent every 8 hours    MEDICATIONS  (PRN):    Allergies    latex (Rash)  eggplant, sesame seeds (Stomach Upset; Anaphylaxis)  Augmentin ES-600 (Nausea; Diarrhea)    Intolerances            CONSTITUTIONAL:  No weight loss, fever, chills, weakness or fatigue.  HEENT:  Eyes:  No visual loss, blurred vision, double vision or yellow sclerae. Ears, Nose, Throat:  No hearing loss, sneezing, congestion, runny nose or sore throat.  SKIN:  No rash or itching.  CARDIOVASCULAR:  No chest pain, chest pressure or chest discomfort. No palpitations or edema.  RESPIRATORY:  No shortness of breath, cough or sputum.  GASTROINTESTINAL:  SEE HPI  GENITOURINARY:  No dysuria, hematuria, urinary frequency  NEUROLOGICAL:  No headache, dizziness, syncope, paralysis, ataxia, numbness or tingling in the extremities. No change in bowel or bladder control.  MUSCULOSKELETAL:  No muscle, back pain, joint pain or stiffness.  HEMATOLOGIC:  No anemia, bleeding or bruising.  LYMPHATICS:  No enlarged nodes. No history of splenectomy.  PSYCHIATRIC:  No history of depression or anxiety.  ENDOCRINOLOGIC:  No reports of sweating, cold or heat intolerance. No polyuria or polydipsia.      ______________________________________________________________________  PHYSICAL EXAM:  T(C): 36.6 (23 @ 04:16), Max: 37.3 (08-15-23 @ 20:14)  HR: 80 (23 @ 04:16)  BP: 95/62 (23 @ 04:16)  RR: 18 (23 @ 04:16)  SpO2: 100% (23 @ 04:16)  Wt(kg): --      GEN: NAD, normocephalic  CVS: S1S2+, no r/g/m  CHEST: clear to auscultation, no r/r/w  ABD: large ascites w/ bruising on abdomen. Not tense  EXTR: no cyanosis, no clubbing, 1+ pitting edema b/l  NEURO: Awake and alert; oriented x3, no asterixis  SKIN:  warm;  non icteric, no palmar erythema or spider angiomatas    ______________________________________________________________________  LABS:                        12.2   3.29  )-----------( 56       ( 15 Aug 2023 14:11 )             37.4     08-15    138  |  107  |  <4<L>  ----------------------------<  136<H>  3.1<L>   |  19<L>  |  0.43<L>    Ca    7.7<L>      15 Aug 2023 14:11    TPro  6.1  /  Alb  x   /  TBili  x   /  DBili  x   /  AST  x   /  ALT  x   /  AlkPhos  x   08-15    LIVER FUNCTIONS - ( 15 Aug 2023 14:15 )  Alb: x     / Pro: 6.1 g/dL / ALK PHOS: x     / ALT: x     / AST: x     / GGT: x             ____________________________________________    IMAGING:  CT uploaded to our system    ______________________________________________________________________

## 2023-08-16 NOTE — PATIENT PROFILE ADULT - NSPROEXTENSIONSOFSELF_GEN_A_NUR
cellphone, apple wrist watch, clothing, sandals, bag pack, pocket book, hand bag, wedding band, necklace w/ pendant, 2 costume bracelets/eyeglasses

## 2023-08-17 LAB
A1C WITH ESTIMATED AVERAGE GLUCOSE RESULT: 5.8 % — HIGH (ref 4–5.6)
ALBUMIN FLD-MCNC: 0.6 G/DL — SIGNIFICANT CHANGE UP
ALBUMIN SERPL ELPH-MCNC: 2.7 G/DL — LOW (ref 3.3–5)
ALP SERPL-CCNC: 86 U/L — SIGNIFICANT CHANGE UP (ref 40–120)
ALT FLD-CCNC: 33 U/L — SIGNIFICANT CHANGE UP (ref 10–45)
ANA PAT FLD IF-IMP: ABNORMAL
ANA TITR SER: ABNORMAL
ANION GAP SERPL CALC-SCNC: 9 MMOL/L — SIGNIFICANT CHANGE UP (ref 5–17)
AST SERPL-CCNC: 46 U/L — HIGH (ref 10–40)
B PERT IGG+IGM PNL SER: ABNORMAL
BILIRUB SERPL-MCNC: 1.1 MG/DL — SIGNIFICANT CHANGE UP (ref 0.2–1.2)
BUN SERPL-MCNC: <4 MG/DL — LOW (ref 7–23)
CALCIUM SERPL-MCNC: 7.7 MG/DL — LOW (ref 8.4–10.5)
CHLORIDE SERPL-SCNC: 108 MMOL/L — SIGNIFICANT CHANGE UP (ref 96–108)
CO2 SERPL-SCNC: 23 MMOL/L — SIGNIFICANT CHANGE UP (ref 22–31)
COLOR FLD: YELLOW
CREAT SERPL-MCNC: 0.43 MG/DL — LOW (ref 0.5–1.3)
EGFR: 111 ML/MIN/1.73M2 — SIGNIFICANT CHANGE UP
ESTIMATED AVERAGE GLUCOSE: 120 MG/DL — HIGH (ref 68–114)
FLUID INTAKE SUBSTANCE CLASS: SIGNIFICANT CHANGE UP
GLUCOSE BLDC GLUCOMTR-MCNC: 106 MG/DL — HIGH (ref 70–99)
GLUCOSE BLDC GLUCOMTR-MCNC: 124 MG/DL — HIGH (ref 70–99)
GLUCOSE BLDC GLUCOMTR-MCNC: 179 MG/DL — HIGH (ref 70–99)
GLUCOSE BLDC GLUCOMTR-MCNC: 88 MG/DL — SIGNIFICANT CHANGE UP (ref 70–99)
GLUCOSE FLD-MCNC: 172 MG/DL — SIGNIFICANT CHANGE UP
GLUCOSE SERPL-MCNC: 92 MG/DL — SIGNIFICANT CHANGE UP (ref 70–99)
HCT VFR BLD CALC: 32.2 % — LOW (ref 34.5–45)
HCV AB S/CO SERPL IA: 0.19 S/CO — SIGNIFICANT CHANGE UP (ref 0–0.99)
HCV AB SERPL-IMP: SIGNIFICANT CHANGE UP
HGB BLD-MCNC: 10.6 G/DL — LOW (ref 11.5–15.5)
IGA FLD-MCNC: 485 MG/DL — SIGNIFICANT CHANGE UP (ref 84–499)
IGG FLD-MCNC: 1028 MG/DL — SIGNIFICANT CHANGE UP (ref 610–1660)
IGM SERPL-MCNC: 371 MG/DL — HIGH (ref 35–242)
INR BLD: 1.76 RATIO — HIGH (ref 0.85–1.18)
KAPPA LC SER QL IFE: 5.75 MG/DL — HIGH (ref 0.33–1.94)
KAPPA/LAMBDA FREE LIGHT CHAIN RATIO, SERUM: 0.85 RATIO — SIGNIFICANT CHANGE UP (ref 0.26–1.65)
LAMBDA LC SER QL IFE: 6.77 MG/DL — HIGH (ref 0.57–2.63)
LDH SERPL L TO P-CCNC: 47 U/L — SIGNIFICANT CHANGE UP
LYMPHOCYTES # FLD: 38 % — SIGNIFICANT CHANGE UP
MCHC RBC-ENTMCNC: 28.7 PG — SIGNIFICANT CHANGE UP (ref 27–34)
MCHC RBC-ENTMCNC: 32.9 GM/DL — SIGNIFICANT CHANGE UP (ref 32–36)
MCV RBC AUTO: 87.3 FL — SIGNIFICANT CHANGE UP (ref 80–100)
MELD SCORE WITH DIALYSIS: 26 POINTS — SIGNIFICANT CHANGE UP
MELD SCORE WITHOUT DIALYSIS: 13 POINTS — SIGNIFICANT CHANGE UP
MESOTHL CELL # FLD: 3 % — SIGNIFICANT CHANGE UP
MONOS+MACROS # FLD: 50 % — SIGNIFICANT CHANGE UP
NEUTROPHILS-BODY FLUID: 5 % — SIGNIFICANT CHANGE UP
NRBC # BLD: 0 /100 WBCS — SIGNIFICANT CHANGE UP (ref 0–0)
OTHER CELLS FLD MANUAL: 4 % — SIGNIFICANT CHANGE UP
PLATELET # BLD AUTO: 55 K/UL — LOW (ref 150–400)
POTASSIUM SERPL-MCNC: 3.8 MMOL/L — SIGNIFICANT CHANGE UP (ref 3.5–5.3)
POTASSIUM SERPL-SCNC: 3.8 MMOL/L — SIGNIFICANT CHANGE UP (ref 3.5–5.3)
PROT FLD-MCNC: 0.7 G/DL — SIGNIFICANT CHANGE UP
PROT SERPL-MCNC: 5.1 G/DL — LOW (ref 6–8.3)
PROTHROM AB SERPL-ACNC: 19 SEC — HIGH (ref 9.5–13)
RBC # BLD: 3.69 M/UL — LOW (ref 3.8–5.2)
RBC # FLD: 13.7 % — SIGNIFICANT CHANGE UP (ref 10.3–14.5)
RCV VOL RI: 3000 CELLS/UL — HIGH (ref 0–5)
SMOOTH MUSCLE AB SER-ACNC: ABNORMAL
SODIUM SERPL-SCNC: 140 MMOL/L — SIGNIFICANT CHANGE UP (ref 135–145)
TOTAL NUCLEATED CELL COUNT, BODY FLUID: 202 CELLS/UL — HIGH (ref 0–5)
TUBE TYPE: SIGNIFICANT CHANGE UP
WBC # BLD: 2.83 K/UL — LOW (ref 3.8–10.5)
WBC # FLD AUTO: 2.83 K/UL — LOW (ref 3.8–10.5)

## 2023-08-17 PROCEDURE — 88305 TISSUE EXAM BY PATHOLOGIST: CPT | Mod: 26

## 2023-08-17 PROCEDURE — 88112 CYTOPATH CELL ENHANCE TECH: CPT | Mod: 26

## 2023-08-17 PROCEDURE — 49082 ABD PARACENTESIS: CPT

## 2023-08-17 PROCEDURE — 99232 SBSQ HOSP IP/OBS MODERATE 35: CPT | Mod: GC

## 2023-08-17 RX ORDER — ACETAMINOPHEN 500 MG
1000 TABLET ORAL ONCE
Refills: 0 | Status: COMPLETED | OUTPATIENT
Start: 2023-08-17 | End: 2023-08-17

## 2023-08-17 RX ADMIN — Medication 1000 MILLIGRAM(S): at 22:22

## 2023-08-17 RX ADMIN — Medication 100 MILLIGRAM(S): at 22:31

## 2023-08-17 RX ADMIN — Medication 100 MILLIGRAM(S): at 13:50

## 2023-08-17 RX ADMIN — Medication 400 MILLIGRAM(S): at 21:52

## 2023-08-17 RX ADMIN — Medication 100 MILLIGRAM(S): at 05:14

## 2023-08-17 RX ADMIN — Medication 200 MILLIGRAM(S): at 06:05

## 2023-08-17 RX ADMIN — Medication 200 MILLIGRAM(S): at 18:33

## 2023-08-17 NOTE — PROGRESS NOTE ADULT - ATTENDING COMMENTS
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 Outside CT (8/12) with patent portomesenteric venous system. S/p diagnostic paracentesis today and will follow-up ascitic fluid studies. Continuing diuresis with furosemide 40 mg po daily and spironolactone 100 mg po daily and will titrate if needed. Current MELD 3.0 of 14. We have discussed potential consideration of elective TIPS and/or liver transplantation in the future if she develops refractory ascites not alleviated with diuretics and salt restricted diet.    Please don't hesitate to call with any questions or concerns.    Baudilio Christine M.D., Ph.D.  Transplant Hepatology

## 2023-08-17 NOTE — PROGRESS NOTE ADULT - SUBJECTIVE AND OBJECTIVE BOX
ARCHIE REZA  58y Female  MRN:[id number]    Patient is a 58y old  Female who presents with a chief complaint of   HPI:  58-year-old female history of diabetes presenting to the emergency department with 4 days of continuous watery diarrhea.  On day 2 of her symptoms patient was seen at an outside hospital was brought into observation for dehydration and electrolyte abnormalities at that time had a CT of the abdomen pelvis performed showing diffuse colitis and was started on Cipro Flagyl.  Patient is only been on Cipro Flagyl for 24 hours without improvement of her symptoms.  Was seen by gastrointestinal specialist yesterday that recommended that she get a myriad of blood work done which she has not performed yet.  Patient presenting to our hospital today due to persistent symptoms reports 8 bowel movements today that were all diarrhea nonbloody nonbilious.  No fevers no chills no nausea no vomiting.  No chest pain or shortness of breath.  Nothing makes her complaints better or worse has not taking anything in particular to stop the diarrhea. Of note, mother had cirrhosis. Pt with negative C.Diff, negative COVID at OSH. (15 Aug 2023 20:26)      Patient seen and evaluated at bedside. No acute events overnight except as noted.    Interval HPI: no acute events o/n     PAST MEDICAL & SURGICAL HISTORY:  Diabetes      Asthma      S/P appendectomy        S/P cholecystectomy        S/P   ,          REVIEW OF SYSTEMS:  as per      VITALS:   Vital Signs Last 24 Hrs  T(C): 37.2 (17 Aug 2023 04:40), Max: 37.3 (16 Aug 2023 18:50)  T(F): 99 (17 Aug 2023 04:40), Max: 99.1 (16 Aug 2023 18:50)  HR: 84 (17 Aug 2023 04:40) (79 - 87)  BP: 102/65 (17 Aug 2023 04:40) (98/65 - 106/72)  BP(mean): --  RR: 18 (17 Aug 2023 04:40) (18 - 18)  SpO2: 95% (17 Aug 2023 04:40) (95% - 99%)    Parameters below as of 17 Aug 2023 04:40  Patient On (Oxygen Delivery Method): room air        PHYSICAL EXAM:  GENERAL: NAD, well-developed  HEAD:  Atraumatic, Normocephalic  EYES: EOMI, PERRLA, conjunctiva and sclera clear  NECK: Supple, No JVD  CHEST/LUNG: Clear to auscultation bilaterally; No wheeze  HEART: S1, S2; No murmurs, rubs, or gallops  ABDOMEN: Soft, Nontender, +distended; Bowel sounds present  EXTREMITIES:  2+ Peripheral Pulses, No clubbing, cyanosis, or edema  PSYCH: Normal affect  NEUROLOGY: AAOX3; non-focal  SKIN: No rashes or lesions    Consultant(s) Notes Reviewed:  [x ] YES  [ ] NO  Care Discussed with Consultants/Other Providers [ x] YES  [ ] NO    MEDS:   MEDICATIONS  (STANDING):  ciprofloxacin   IVPB 400 milliGRAM(s) IV Intermittent every 12 hours  dextrose 5%. 1000 milliLiter(s) (50 mL/Hr) IV Continuous <Continuous>  dextrose 5%. 1000 milliLiter(s) (100 mL/Hr) IV Continuous <Continuous>  dextrose 50% Injectable 25 Gram(s) IV Push once  dextrose 50% Injectable 12.5 Gram(s) IV Push once  dextrose 50% Injectable 25 Gram(s) IV Push once  furosemide    Tablet 40 milliGRAM(s) Oral daily  glucagon  Injectable 1 milliGRAM(s) IntraMuscular once  insulin lispro (ADMELOG) corrective regimen sliding scale   SubCutaneous three times a day before meals  insulin lispro (ADMELOG) corrective regimen sliding scale   SubCutaneous at bedtime  metroNIDAZOLE  IVPB 500 milliGRAM(s) IV Intermittent every 8 hours  spironolactone 100 milliGRAM(s) Oral daily    MEDICATIONS  (PRN):  dextrose Oral Gel 15 Gram(s) Oral once PRN Blood Glucose LESS THAN 70 milliGRAM(s)/deciliter      ALLERGIES:  latex (Rash)  eggplant, sesame seeds (Stomach Upset; Anaphylaxis)  Augmentin ES-600 (Nausea; Diarrhea)      LABS:                                           10.6   2.83  )-----------( 55       ( 17 Aug 2023 06:48 )             32.2   08-17    140  |  108  |  <4<L>  ----------------------------<  92  3.8   |  23  |  0.43<L>    Ca    7.7<L>      17 Aug 2023 06:45    TPro  5.1<L>  /  Alb  2.7<L>  /  TBili  1.1  /  DBili  x   /  AST  46<H>  /  ALT  33  /  AlkPhos  86  08-17     < from: US Abdomen Limited (23 @ 14:13) >  IMPRESSION:  Trace ascites in the upper quadrants.    --- End of Report ---      < end of copied text >

## 2023-08-17 NOTE — PROGRESS NOTE ADULT - SUBJECTIVE AND OBJECTIVE BOX
Interval Events:   -Feeling well. Reports she peed like a race horse last night  -Diarrhea improved- tested pos for giardia and ETEC  -diagnostic para today- 100ml out    Hospital Medications:  ciprofloxacin   IVPB 400 milliGRAM(s) IV Intermittent every 12 hours  dextrose 5%. 1000 milliLiter(s) IV Continuous <Continuous>  dextrose 5%. 1000 milliLiter(s) IV Continuous <Continuous>  dextrose 50% Injectable 25 Gram(s) IV Push once  dextrose 50% Injectable 12.5 Gram(s) IV Push once  dextrose 50% Injectable 25 Gram(s) IV Push once  dextrose Oral Gel 15 Gram(s) Oral once PRN  furosemide    Tablet 40 milliGRAM(s) Oral daily  glucagon  Injectable 1 milliGRAM(s) IntraMuscular once  insulin lispro (ADMELOG) corrective regimen sliding scale   SubCutaneous three times a day before meals  insulin lispro (ADMELOG) corrective regimen sliding scale   SubCutaneous at bedtime  metroNIDAZOLE  IVPB 500 milliGRAM(s) IV Intermittent every 8 hours  spironolactone 100 milliGRAM(s) Oral daily      ROS: All system reviewed and negative except as mentioned above.    PHYSICAL EXAM:   Vital Signs:  Vital Signs Last 24 Hrs  T(C): 36.8 (17 Aug 2023 15:16), Max: 37.3 (16 Aug 2023 18:50)  T(F): 98.3 (17 Aug 2023 15:16), Max: 99.1 (16 Aug 2023 18:50)  HR: 83 (17 Aug 2023 15:16) (79 - 96)  BP: 130/76 (17 Aug 2023 15:16) (101/- - 130/76)  BP(mean): --  RR: 18 (17 Aug 2023 15:16) (18 - 18)  SpO2: 99% (17 Aug 2023 15:16) (94% - 99%)    Parameters below as of 17 Aug 2023 14:26  Patient On (Oxygen Delivery Method): room air      Daily     Daily     GENERAL:  NAD, Appears stated age  HEENT:  NC/AT,  conjunctivae clear and pink, sclera -anicteric  CHEST:  Normal Effort, Breath sounds clear  HEART:  RRR, S1 + S2, no murmurs  ABDOMEN:  Soft, non-tender, non-distended, normoactive bowel sounds,  no masses  EXTREMITIES:  no cyanosis or edema  SKIN:  Warm & Dry. No rash or erythema  NEURO:  Alert, oriented, no focal deficit    LABS:                        10.6   2.83  )-----------( 55       ( 17 Aug 2023 06:48 )             32.2     Mean Cell Volume: 87.3 fl (08-17-23 @ 06:48)    08-17    140  |  108  |  <4<L>  ----------------------------<  92  3.8   |  23  |  0.43<L>    Ca    7.7<L>      17 Aug 2023 06:45    TPro  5.1<L>  /  Alb  2.7<L>  /  TBili  1.1  /  DBili  x   /  AST  46<H>  /  ALT  33  /  AlkPhos  86  08-17    LIVER FUNCTIONS - ( 17 Aug 2023 06:45 )  Alb: 2.7 g/dL / Pro: 5.1 g/dL / ALK PHOS: 86 U/L / ALT: 33 U/L / AST: 46 U/L / GGT: x           PT/INR - ( 17 Aug 2023 06:45 )   PT: 19.0 sec;   INR: 1.76 ratio         PTT - ( 16 Aug 2023 12:25 )  PTT:34.4 sec  Urinalysis Basic - ( 17 Aug 2023 06:45 )    Color: x / Appearance: x / SG: x / pH: x  Gluc: 92 mg/dL / Ketone: x  / Bili: x / Urobili: x   Blood: x / Protein: x / Nitrite: x   Leuk Esterase: x / RBC: x / WBC x   Sq Epi: x / Non Sq Epi: x / Bacteria: x                              10.6   2.83  )-----------( 55       ( 17 Aug 2023 06:48 )             32.2                         11.5   2.80  )-----------( 55       ( 16 Aug 2023 10:41 )             34.9                         12.2   3.29  )-----------( 56       ( 15 Aug 2023 14:11 )             37.4       Imaging: Images reviewed.     Interval Events:   -Feeling well. Reports she peed like a race horse last night  -Diarrhea improved- tested pos for giardia and ETEC  -diagnostic para today- 100ml out    Hospital Medications:  ciprofloxacin   IVPB 400 milliGRAM(s) IV Intermittent every 12 hours  dextrose 5%. 1000 milliLiter(s) IV Continuous <Continuous>  dextrose 5%. 1000 milliLiter(s) IV Continuous <Continuous>  dextrose 50% Injectable 25 Gram(s) IV Push once  dextrose 50% Injectable 12.5 Gram(s) IV Push once  dextrose 50% Injectable 25 Gram(s) IV Push once  dextrose Oral Gel 15 Gram(s) Oral once PRN  furosemide    Tablet 40 milliGRAM(s) Oral daily  glucagon  Injectable 1 milliGRAM(s) IntraMuscular once  insulin lispro (ADMELOG) corrective regimen sliding scale   SubCutaneous three times a day before meals  insulin lispro (ADMELOG) corrective regimen sliding scale   SubCutaneous at bedtime  metroNIDAZOLE  IVPB 500 milliGRAM(s) IV Intermittent every 8 hours  spironolactone 100 milliGRAM(s) Oral daily      ROS: All system reviewed and negative except as mentioned above.    PHYSICAL EXAM:   Vital Signs:  Vital Signs Last 24 Hrs  T(C): 36.8 (17 Aug 2023 15:16), Max: 37.3 (16 Aug 2023 18:50)  T(F): 98.3 (17 Aug 2023 15:16), Max: 99.1 (16 Aug 2023 18:50)  HR: 83 (17 Aug 2023 15:16) (79 - 96)  BP: 130/76 (17 Aug 2023 15:16) (101/- - 130/76)  BP(mean): --  RR: 18 (17 Aug 2023 15:16) (18 - 18)  SpO2: 99% (17 Aug 2023 15:16) (94% - 99%)    Parameters below as of 17 Aug 2023 14:26  Patient On (Oxygen Delivery Method): room air      Daily     Daily     GENERAL:  NAD, Appears stated age  HEENT:  NC/AT,  conjunctivae clear and pink, sclera -anicteric  CHEST:  Normal Effort, Breath sounds clear  HEART:  RRR, S1 + S2, no murmurs  ABDOMEN:  +ascites, not tense  EXTREMITIES:  1+ edema, b/l, improving  SKIN:  Warm & Dry. No rash or erythema  NEURO:  Alert, oriented, no focal deficit, no asterixis    LABS:                        10.6   2.83  )-----------( 55       ( 17 Aug 2023 06:48 )             32.2     Mean Cell Volume: 87.3 fl (08-17-23 @ 06:48)    08-17    140  |  108  |  <4<L>  ----------------------------<  92  3.8   |  23  |  0.43<L>    Ca    7.7<L>      17 Aug 2023 06:45    TPro  5.1<L>  /  Alb  2.7<L>  /  TBili  1.1  /  DBili  x   /  AST  46<H>  /  ALT  33  /  AlkPhos  86  08-17    LIVER FUNCTIONS - ( 17 Aug 2023 06:45 )  Alb: 2.7 g/dL / Pro: 5.1 g/dL / ALK PHOS: 86 U/L / ALT: 33 U/L / AST: 46 U/L / GGT: x           PT/INR - ( 17 Aug 2023 06:45 )   PT: 19.0 sec;   INR: 1.76 ratio         PTT - ( 16 Aug 2023 12:25 )  PTT:34.4 sec  Urinalysis Basic - ( 17 Aug 2023 06:45 )    Color: x / Appearance: x / SG: x / pH: x  Gluc: 92 mg/dL / Ketone: x  / Bili: x / Urobili: x   Blood: x / Protein: x / Nitrite: x   Leuk Esterase: x / RBC: x / WBC x   Sq Epi: x / Non Sq Epi: x / Bacteria: x                              10.6   2.83  )-----------( 55       ( 17 Aug 2023 06:48 )             32.2                         11.5   2.80  )-----------( 55       ( 16 Aug 2023 10:41 )             34.9                         12.2   3.29  )-----------( 56       ( 15 Aug 2023 14:11 )             37.4       Imaging: Images reviewed.

## 2023-08-17 NOTE — PRE PROCEDURE NOTE - PRE PROCEDURE EVALUATION
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 IR consulted for paracentesis.     Allergies: latex (Rash)  Augmentin ES-600 (Nausea; Diarrhea)    Medications (Abx/Cardiac/Anticoagulation/Blood Products)  ciprofloxacin   IVPB: 200 mL/Hr IV Intermittent (08-17 @ 06:05)  furosemide    Tablet: 40 milliGRAM(s) Oral (08-16 @ 18:53)  metroNIDAZOLE  IVPB: 100 mL/Hr IV Intermittent (08-17 @ 13:50)  spironolactone: 100 milliGRAM(s) Oral (08-16 @ 18:53)    Data:    T(C): 37.2  HR: 84  BP: 102/65  RR: 18  SpO2: 95%    Exam  General: No acute distress  Chest: Non labored breathing      -WBC 2.83 / HgB 10.6 / Hct 32.2 / Plt 55  -Na 140 / Cl 108 / BUN <4 / Glucose 92  -K 3.8 / CO2 23 / Cr 0.43  -ALT 33 / Alk Phos 86 / T.Bili 1.1  -INR1.76    Imaging:     Plan: 58y Female presents for Paracentesis   -Risks/Benefits/alternatives explained with the patient and/or healthcare proxy and witnessed informed consent obtained.

## 2023-08-18 ENCOUNTER — TRANSCRIPTION ENCOUNTER (OUTPATIENT)
Age: 58
End: 2023-08-18

## 2023-08-18 VITALS
OXYGEN SATURATION: 97 % | TEMPERATURE: 98 F | HEART RATE: 100 BPM | DIASTOLIC BLOOD PRESSURE: 63 MMHG | RESPIRATION RATE: 18 BRPM | SYSTOLIC BLOOD PRESSURE: 113 MMHG

## 2023-08-18 LAB
ALBUMIN SERPL ELPH-MCNC: 2.7 G/DL — LOW (ref 3.3–5)
ALP SERPL-CCNC: 83 U/L — SIGNIFICANT CHANGE UP (ref 40–120)
ALT FLD-CCNC: 34 U/L — SIGNIFICANT CHANGE UP (ref 10–45)
ANION GAP SERPL CALC-SCNC: 11 MMOL/L — SIGNIFICANT CHANGE UP (ref 5–17)
APTT BLD: 33.4 SEC — SIGNIFICANT CHANGE UP (ref 24.5–35.6)
AST SERPL-CCNC: 49 U/L — HIGH (ref 10–40)
BILIRUB SERPL-MCNC: 1.1 MG/DL — SIGNIFICANT CHANGE UP (ref 0.2–1.2)
BUN SERPL-MCNC: <4 MG/DL — LOW (ref 7–23)
CALCIUM SERPL-MCNC: 8.1 MG/DL — LOW (ref 8.4–10.5)
CHLORIDE SERPL-SCNC: 107 MMOL/L — SIGNIFICANT CHANGE UP (ref 96–108)
CO2 SERPL-SCNC: 23 MMOL/L — SIGNIFICANT CHANGE UP (ref 22–31)
CREAT SERPL-MCNC: 0.42 MG/DL — LOW (ref 0.5–1.3)
EGFR: 112 ML/MIN/1.73M2 — SIGNIFICANT CHANGE UP
GLUCOSE BLDC GLUCOMTR-MCNC: 105 MG/DL — HIGH (ref 70–99)
GLUCOSE BLDC GLUCOMTR-MCNC: 118 MG/DL — HIGH (ref 70–99)
GLUCOSE SERPL-MCNC: 98 MG/DL — SIGNIFICANT CHANGE UP (ref 70–99)
GRAM STN FLD: SIGNIFICANT CHANGE UP
HCT VFR BLD CALC: 34.5 % — SIGNIFICANT CHANGE UP (ref 34.5–45)
HGB BLD-MCNC: 11.4 G/DL — LOW (ref 11.5–15.5)
INR BLD: 1.7 RATIO — HIGH (ref 0.85–1.18)
MCHC RBC-ENTMCNC: 29.2 PG — SIGNIFICANT CHANGE UP (ref 27–34)
MCHC RBC-ENTMCNC: 33 GM/DL — SIGNIFICANT CHANGE UP (ref 32–36)
MCV RBC AUTO: 88.5 FL — SIGNIFICANT CHANGE UP (ref 80–100)
NRBC # BLD: 0 /100 WBCS — SIGNIFICANT CHANGE UP (ref 0–0)
PLATELET # BLD AUTO: 67 K/UL — LOW (ref 150–400)
POTASSIUM SERPL-MCNC: 4.1 MMOL/L — SIGNIFICANT CHANGE UP (ref 3.5–5.3)
POTASSIUM SERPL-SCNC: 4.1 MMOL/L — SIGNIFICANT CHANGE UP (ref 3.5–5.3)
PROT SERPL-MCNC: 5.6 G/DL — LOW (ref 6–8.3)
PROTHROM AB SERPL-ACNC: 18.4 SEC — HIGH (ref 9.5–13)
RBC # BLD: 3.9 M/UL — SIGNIFICANT CHANGE UP (ref 3.8–5.2)
RBC # FLD: 13.9 % — SIGNIFICANT CHANGE UP (ref 10.3–14.5)
SODIUM SERPL-SCNC: 141 MMOL/L — SIGNIFICANT CHANGE UP (ref 135–145)
SPECIMEN SOURCE: SIGNIFICANT CHANGE UP
WBC # BLD: 2.84 K/UL — LOW (ref 3.8–10.5)
WBC # FLD AUTO: 2.84 K/UL — LOW (ref 3.8–10.5)

## 2023-08-18 PROCEDURE — 87205 SMEAR GRAM STAIN: CPT

## 2023-08-18 PROCEDURE — 82435 ASSAY OF BLOOD CHLORIDE: CPT

## 2023-08-18 PROCEDURE — 76705 ECHO EXAM OF ABDOMEN: CPT

## 2023-08-18 PROCEDURE — 86334 IMMUNOFIX E-PHORESIS SERUM: CPT

## 2023-08-18 PROCEDURE — 85014 HEMATOCRIT: CPT

## 2023-08-18 PROCEDURE — 82945 GLUCOSE OTHER FLUID: CPT

## 2023-08-18 PROCEDURE — 80074 ACUTE HEPATITIS PANEL: CPT

## 2023-08-18 PROCEDURE — 87070 CULTURE OTHR SPECIMN AEROBIC: CPT

## 2023-08-18 PROCEDURE — 87077 CULTURE AEROBIC IDENTIFY: CPT

## 2023-08-18 PROCEDURE — 80061 LIPID PANEL: CPT

## 2023-08-18 PROCEDURE — 82010 KETONE BODYS QUAN: CPT

## 2023-08-18 PROCEDURE — 85610 PROTHROMBIN TIME: CPT

## 2023-08-18 PROCEDURE — 82728 ASSAY OF FERRITIN: CPT

## 2023-08-18 PROCEDURE — 84132 ASSAY OF SERUM POTASSIUM: CPT

## 2023-08-18 PROCEDURE — 71045 X-RAY EXAM CHEST 1 VIEW: CPT

## 2023-08-18 PROCEDURE — 86381 MITOCHONDRIAL ANTIBODY EACH: CPT

## 2023-08-18 PROCEDURE — 82962 GLUCOSE BLOOD TEST: CPT

## 2023-08-18 PROCEDURE — 87045 FECES CULTURE AEROBIC BACT: CPT

## 2023-08-18 PROCEDURE — 36000 PLACE NEEDLE IN VEIN: CPT

## 2023-08-18 PROCEDURE — 82103 ALPHA-1-ANTITRYPSIN TOTAL: CPT

## 2023-08-18 PROCEDURE — 84155 ASSAY OF PROTEIN SERUM: CPT

## 2023-08-18 PROCEDURE — 88112 CYTOPATH CELL ENHANCE TECH: CPT

## 2023-08-18 PROCEDURE — 84165 PROTEIN E-PHORESIS SERUM: CPT

## 2023-08-18 PROCEDURE — 85025 COMPLETE CBC W/AUTO DIFF WBC: CPT

## 2023-08-18 PROCEDURE — 83605 ASSAY OF LACTIC ACID: CPT

## 2023-08-18 PROCEDURE — 87507 IADNA-DNA/RNA PROBE TQ 12-25: CPT

## 2023-08-18 PROCEDURE — 82947 ASSAY GLUCOSE BLOOD QUANT: CPT

## 2023-08-18 PROCEDURE — 85730 THROMBOPLASTIN TIME PARTIAL: CPT

## 2023-08-18 PROCEDURE — 83615 LACTATE (LD) (LDH) ENZYME: CPT

## 2023-08-18 PROCEDURE — 99285 EMERGENCY DEPT VISIT HI MDM: CPT

## 2023-08-18 PROCEDURE — 84295 ASSAY OF SERUM SODIUM: CPT

## 2023-08-18 PROCEDURE — 80053 COMPREHEN METABOLIC PANEL: CPT

## 2023-08-18 PROCEDURE — 87177 OVA AND PARASITES SMEARS: CPT

## 2023-08-18 PROCEDURE — 84157 ASSAY OF PROTEIN OTHER: CPT

## 2023-08-18 PROCEDURE — 83550 IRON BINDING TEST: CPT

## 2023-08-18 PROCEDURE — 36415 COLL VENOUS BLD VENIPUNCTURE: CPT

## 2023-08-18 PROCEDURE — 85027 COMPLETE CBC AUTOMATED: CPT

## 2023-08-18 PROCEDURE — 86038 ANTINUCLEAR ANTIBODIES: CPT

## 2023-08-18 PROCEDURE — 85018 HEMOGLOBIN: CPT

## 2023-08-18 PROCEDURE — 84466 ASSAY OF TRANSFERRIN: CPT

## 2023-08-18 PROCEDURE — 89051 BODY FLUID CELL COUNT: CPT

## 2023-08-18 PROCEDURE — 12345: CPT | Mod: NC,GC

## 2023-08-18 PROCEDURE — 86803 HEPATITIS C AB TEST: CPT

## 2023-08-18 PROCEDURE — 82803 BLOOD GASES ANY COMBINATION: CPT

## 2023-08-18 PROCEDURE — C1729: CPT

## 2023-08-18 PROCEDURE — 82042 OTHER SOURCE ALBUMIN QUAN EA: CPT

## 2023-08-18 PROCEDURE — 82784 ASSAY IGA/IGD/IGG/IGM EACH: CPT

## 2023-08-18 PROCEDURE — 87046 STOOL CULTR AEROBIC BACT EA: CPT

## 2023-08-18 PROCEDURE — 87102 FUNGUS ISOLATION CULTURE: CPT

## 2023-08-18 PROCEDURE — 83036 HEMOGLOBIN GLYCOSYLATED A1C: CPT

## 2023-08-18 PROCEDURE — 83690 ASSAY OF LIPASE: CPT

## 2023-08-18 PROCEDURE — 83540 ASSAY OF IRON: CPT

## 2023-08-18 PROCEDURE — 86255 FLUORESCENT ANTIBODY SCREEN: CPT

## 2023-08-18 PROCEDURE — 87075 CULTR BACTERIA EXCEPT BLOOD: CPT

## 2023-08-18 PROCEDURE — 82330 ASSAY OF CALCIUM: CPT

## 2023-08-18 PROCEDURE — 82550 ASSAY OF CK (CPK): CPT

## 2023-08-18 PROCEDURE — 88305 TISSUE EXAM BY PATHOLOGIST: CPT

## 2023-08-18 RX ORDER — LOSARTAN POTASSIUM 100 MG/1
1 TABLET, FILM COATED ORAL
Refills: 0 | DISCHARGE

## 2023-08-18 RX ORDER — FUROSEMIDE 40 MG
1 TABLET ORAL
Qty: 30 | Refills: 0
Start: 2023-08-18 | End: 2023-09-16

## 2023-08-18 RX ORDER — SPIRONOLACTONE 25 MG/1
4 TABLET, FILM COATED ORAL
Qty: 120 | Refills: 0
Start: 2023-08-18 | End: 2023-09-16

## 2023-08-18 RX ORDER — METRONIDAZOLE 500 MG
1 TABLET ORAL
Qty: 21 | Refills: 0
Start: 2023-08-18 | End: 2023-08-24

## 2023-08-18 RX ADMIN — Medication 100 MILLIGRAM(S): at 13:31

## 2023-08-18 RX ADMIN — Medication 100 MILLIGRAM(S): at 06:40

## 2023-08-18 RX ADMIN — SPIRONOLACTONE 100 MILLIGRAM(S): 25 TABLET, FILM COATED ORAL at 05:26

## 2023-08-18 RX ADMIN — Medication 40 MILLIGRAM(S): at 05:26

## 2023-08-18 RX ADMIN — Medication 200 MILLIGRAM(S): at 05:26

## 2023-08-18 NOTE — PROGRESS NOTE ADULT - ASSESSMENT
58 female h/o dm, here with c/o diarrhea. reported colitis on CT from OSH.    diarrhea  colitis  check stool pcr, cdiff  cipro/flagyl iv   clears diet  gi consulted  consider repeat CT    cirrhosis  ascites  hepatology consult f/u  paracentesis     thrombocytopenia  unknown etiology  chronic as per pt  monitor    dm  iss  monitor fs           Advanced care planning was discussed with patient and family.  Advanced care planning forms were reviewed and discussed as appropriate.  Differential diagnosis and plan of care discussed with patient after the evaluation.   Pain assessed and judicious use of narcotics when appropriate was discussed.  Importance of Fall prevention discussed.  Counseling on Smoking and Alcohol cessation was offered when appropriate.  Counseling on Diet, exercise, and medication compliance was done.       Approx 60 minutes spent.    
58 female h/o dm, here with c/o diarrhea. reported colitis on CT from OSH.    diarrhea  colitis  gi pcr positive for ecoli  cipro/flagyl iv   diet as tolerated  gi consulted    cirrhosis  ascites  hepatology consult f/u  paracentesis with ir today    thrombocytopenia  unknown etiology  chronic as per pt  monitor    dm  iss  monitor fs           Advanced care planning was discussed with patient and family.  Advanced care planning forms were reviewed and discussed as appropriate.  Differential diagnosis and plan of care discussed with patient after the evaluation.   Pain assessed and judicious use of narcotics when appropriate was discussed.  Importance of Fall prevention discussed.  Counseling on Smoking and Alcohol cessation was offered when appropriate.  Counseling on Diet, exercise, and medication compliance was done.       Approx 60 minutes spent.    
  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 Iron studies unremarkable, Alpha 1 antitrypsin wnl, hepatitis panel wnl, autoimmune w/u relatively unremarkable. In terms of etiology of decompensation, no e/o PVT, does have active infection w/ fluid studies pending  V: Remains hypervolemic w/ LE edema  I: No E/o SBP  B: Needs screening EGD prior to d/c  E: No e/o HE on exam  S: F/u outpt    #diarrhea  Tested pos for ETEC and giardia    Recommendations:  -Will plan for EGD today. Keep NPO  -F/u anti-LKM  -C/w aldactone 100mg daily and lasix 40mg daily   -C/w flagyl for giardia. Can likely hold cipro  -Please monitor I/O  -daily weights  -Please put on 2g Na restriction    Hepatology will continue to follow    Note incomplete until finalized by attending signature/attestation.    Marjan Maldonado  GI/Hepatology Fellow PGY5    NON-URGENT CONSULTS:  Please email [email or web address] OR [email or web address]  AT NIGHT AND ON WEEKENDS:  Available on [employer] Teams  [phone] (Long Range Pager)    After 5pm, please contact the on-call GI fellow. [phone]
57yo w/ PMhx DMII presenting w/ watery diarrhea and ascites, most likely 2/2 MADDOX cirrhosis.    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 Iron studies unremarkable, Alpha 1 antitrypsin wnl, hepatitis panel wnl, autoimmune w/u relatively unremarkable. In terms of etiology of decompensation, no e/o PVT, does have active infection w/ fluid studies pending  V: e/o large ascites on exam though not significant on U/S  I: Fluid studies pending  B: Needs screening EGD prior to d/c  E: No e/o HE on exam  S: F/u outpt    #diarrhea  Tested pos for ETEC and giardia    Recommendations:  -F/u fluid studies  -F/u anti-LKM  -C/w aldactone 100mg daily and lasix 40mg daily   -C/w flagyl for giardia. Can likely hold cipro  -Monitor I/O  -daily weights  -Please put on 2g Na restriction    Hepatology will continue to follow    Note incomplete until finalized by attending signature/attestation.    Marjan Maldonado  GI/Hepatology Fellow PGY5    NON-URGENT CONSULTS:  Please email [email or web address] OR [email or web address]  AT NIGHT AND ON WEEKENDS:  Available on [employer] Teams  [phone] (Long Range Pager)    After 5pm, please contact the on-call GI fellow. [phone]  
58 female h/o dm, here with c/o diarrhea. reported colitis on CT from OSH.    diarrhea  colitis  gi pcr positive for ecoli, giardia   gi f/u  flagyl  diarrea resolved    cirrhosis  ascites  hepatology consult f/u  s/p paracentesis. f/u fluid cytology and analysis  for EGD today    thrombocytopenia  likely 2/2 liver dz  chronic as per pt  monitor    dm  iss  monitor fs     dc planning after egd if ok with hepatology       Advanced care planning was discussed with patient and family.  Advanced care planning forms were reviewed and discussed as appropriate.  Differential diagnosis and plan of care discussed with patient after the evaluation.   Pain assessed and judicious use of narcotics when appropriate was discussed.  Importance of Fall prevention discussed.  Counseling on Smoking and Alcohol cessation was offered when appropriate.  Counseling on Diet, exercise, and medication compliance was done.       Approx 60 minutes spent.

## 2023-08-18 NOTE — DISCHARGE NOTE PROVIDER - CARE PROVIDERS DIRECT ADDRESSES
,DirectAddress_Unknown ,DirectAddress_Unknown,[email or web address]

## 2023-08-18 NOTE — CHART NOTE - NSCHARTNOTEFT_GEN_A_CORE
Spoke to patient- ok for endoscopy outpt.   -Can c/w aldactone 100mg, lasix 40mg on discharge  -Will email for hepatology follow up for patient. They will call her with appt

## 2023-08-18 NOTE — DISCHARGE NOTE PROVIDER - PROVIDER TOKENS
PROVIDER:[TOKEN:[[id number]],FOLLOWUP:[1 week]] hypoglycemia PROVIDER:[TOKEN:[[id number]],FOLLOWUP:[1 week]],PROVIDER:[TOKEN:[[id number]]]

## 2023-08-18 NOTE — DISCHARGE NOTE PROVIDER - HOSPITAL COURSE
58 female h/o dm, here with c/o diarrhea. reported colitis on CT from OSH.    1) diarrhea/ colitis  -gi pcr positive for ecoli, giardia   -hepatology following  -resolved  -dc'ed on flagyl 500 mg tid x 7 days     2) cirrhosis  -had ascites, s/p paracentesis 8/17 drained 100 cc with fluid studies sent, unremarkable  -f/u outpt EGD     3) thrombocytopenia  -likely 2/2 liver dz  -chronic as per pt    3) dm  -ISS      Medically cleared to be dc'ed home per attending Dr. Mcgraw on 8/18/23.

## 2023-08-18 NOTE — PROGRESS NOTE ADULT - SUBJECTIVE AND OBJECTIVE BOX
ARCHIE REZA  58y Female  MRN:[id number]    Patient is a 58y old  Female who presents with a chief complaint of   HPI:  58-year-old female history of diabetes presenting to the emergency department with 4 days of continuous watery diarrhea.  On day 2 of her symptoms patient was seen at an outside hospital was brought into observation for dehydration and electrolyte abnormalities at that time had a CT of the abdomen pelvis performed showing diffuse colitis and was started on Cipro Flagyl.  Patient is only been on Cipro Flagyl for 24 hours without improvement of her symptoms.  Was seen by gastrointestinal specialist yesterday that recommended that she get a myriad of blood work done which she has not performed yet.  Patient presenting to our hospital today due to persistent symptoms reports 8 bowel movements today that were all diarrhea nonbloody nonbilious.  No fevers no chills no nausea no vomiting.  No chest pain or shortness of breath.  Nothing makes her complaints better or worse has not taking anything in particular to stop the diarrhea. Of note, mother had cirrhosis. Pt with negative C.Diff, negative COVID at OSH. (15 Aug 2023 20:26)      Patient seen and evaluated at bedside. No acute events overnight except as noted.    Interval HPI: s/p paracentesis     PAST MEDICAL & SURGICAL HISTORY:  Diabetes      Asthma      S/P appendectomy        S/P cholecystectomy        S/P   ,          REVIEW OF SYSTEMS:  as per      VITALS:   Vital Signs Last 24 Hrs  T(C): 36.9 (18 Aug 2023 11:24), Max: 36.9 (17 Aug 2023 14:26)  T(F): 98.5 (18 Aug 2023 11:24), Max: 98.5 (17 Aug 2023 14:26)  HR: 100 (18 Aug 2023 11:24) (79 - 100)  BP: 116/78 (18 Aug 2023 11:24) (99/56 - 130/76)  BP(mean): --  RR: 18 (18 Aug 2023 11:24) (18 - 18)  SpO2: 96% (18 Aug 2023 11:24) (94% - 99%)    Parameters below as of 18 Aug 2023 11:24  Patient On (Oxygen Delivery Method): room air          PHYSICAL EXAM:  GENERAL: NAD, well-developed  HEAD:  Atraumatic, Normocephalic  EYES: EOMI, PERRLA, conjunctiva and sclera clear  NECK: Supple, No JVD  CHEST/LUNG: Clear to auscultation bilaterally; No wheeze  HEART: S1, S2; No murmurs, rubs, or gallops  ABDOMEN: Soft, Nontender, +distended; Bowel sounds present  EXTREMITIES:  2+ Peripheral Pulses, No clubbing, cyanosis, or edema  PSYCH: Normal affect  NEUROLOGY: AAOX3; non-focal  SKIN: No rashes or lesions    Consultant(s) Notes Reviewed:  [x ] YES  [ ] NO  Care Discussed with Consultants/Other Providers [ x] YES  [ ] NO    MEDS:   MEDICATIONS  (STANDING):  dextrose 5%. 1000 milliLiter(s) (50 mL/Hr) IV Continuous <Continuous>  dextrose 5%. 1000 milliLiter(s) (100 mL/Hr) IV Continuous <Continuous>  dextrose 50% Injectable 25 Gram(s) IV Push once  dextrose 50% Injectable 12.5 Gram(s) IV Push once  dextrose 50% Injectable 25 Gram(s) IV Push once  furosemide    Tablet 40 milliGRAM(s) Oral daily  glucagon  Injectable 1 milliGRAM(s) IntraMuscular once  insulin lispro (ADMELOG) corrective regimen sliding scale   SubCutaneous three times a day before meals  insulin lispro (ADMELOG) corrective regimen sliding scale   SubCutaneous at bedtime  metroNIDAZOLE  IVPB 500 milliGRAM(s) IV Intermittent every 8 hours  spironolactone 100 milliGRAM(s) Oral daily    ALLERGIES:  latex (Rash)  eggplant, sesame seeds (Stomach Upset; Anaphylaxis)  Augmentin ES-600 (Nausea; Diarrhea)      LABS:                                                                 11.4   2.84  )-----------( 67       ( 18 Aug 2023 07:11 )             34.5   08-    141  |  107  |  <4<L>  ----------------------------<  98  4.1   |  23  |  0.42<L>    Ca    8.1<L>      18 Aug 2023 07:13    TPro  5.6<L>  /  Alb  2.7<L>  /  TBili  1.1  /  DBili  x   /  AST  49<H>  /  ALT  34  /  AlkPhos  83  -       < from: US Abdomen Limited (23 @ 14:13) >  IMPRESSION:  Trace ascites in the upper quadrants.    --- End of Report ---      < end of copied text >

## 2023-08-18 NOTE — DISCHARGE NOTE PROVIDER - CARE PROVIDER_API CALL
UMBERTO FOSS  Phone: [phone]  Fax: [phone]  Follow Up Time: 1 week   UMBERTO FOSS  Phone: [phone]  Fax: [phone]  Follow Up Time: 1 week    Baudilio Chrsitine  Transplant Hepatology  [address] [place] [id number]  Phone: [phone]  Fax: [phone]  Follow Up Time:

## 2023-08-18 NOTE — DISCHARGE NOTE PROVIDER - NSDCMRMEDTOKEN_GEN_ALL_CORE_FT
Albuterol (Eqv-ProAir HFA) 90 mcg/inh inhalation aerosol: 2 puff(s) inhaled every 4 hours as needed  metFORMIN 500 mg oral tablet, extended release: 1 tab(s) orally once a day  metroNIDAZOLE 500 mg oral tablet: 1 tab(s) orally 3 times a day for 10 days  Prolia 60 mg/mL subcutaneous solution: 60 milligram(s) subcutaneously every 6 months  Trulicity Pen 1.5 mg/0.5 mL subcutaneous solution: 1.5 milligram(s) subcutaneously once a week on mondays   Albuterol (Eqv-ProAir HFA) 90 mcg/inh inhalation aerosol: 2 puff(s) inhaled every 4 hours as needed  furosemide 40 mg oral tablet: 1 tab(s) orally once a day  metFORMIN 500 mg oral tablet, extended release: 1 tab(s) orally once a day  metroNIDAZOLE 500 mg oral tablet: 1 tab(s) orally 3 times a day  Prolia 60 mg/mL subcutaneous solution: 60 milligram(s) subcutaneously every 6 months  spironolactone 25 mg oral tablet: 4 tab(s) orally once a day  Trulicity Pen 1.5 mg/0.5 mL subcutaneous solution: 1.5 milligram(s) subcutaneously once a week on mondays

## 2023-08-18 NOTE — DISCHARGE NOTE PROVIDER - NSDCCPCAREPLAN_GEN_ALL_CORE_FT
PRINCIPAL DISCHARGE DIAGNOSIS  Diagnosis: Cirrhosis  Assessment and Plan of Treatment: Cirrhosis is scarring of the liver which can cause heavy bleeding, swelling, & breathing problems  Call your doctor with belly & leg swelling, shortness of breath, bruising or bleeding easily, feeling full, tired, trouble getting enough or too much sleep, yellowing of skin or whites of eye, sudden confusion, or coma  Causes may include heavy alcohol use, hepatitis B or C, or fatty liver.    You can help yourself by avoiding alcohol, speak with your doctor before starting on any new medication, herbs, vitamins, or supplements which may cause more damage to the liver  Treatment includes treating the cause, reducing blood pressure, low salt diet, diuretics, belly fluid drainage, treating infections, getting vaccinations to prevent common infections, &/or lactulose to improve confusion  It is important to get help if you have an alcohol problem, use condoms when having sex, and nor sharing drug needles if this applies to you       PRINCIPAL DISCHARGE DIAGNOSIS  Diagnosis: Cirrhosis  Assessment and Plan of Treatment: Cirrhosis is scarring of the liver which can cause heavy bleeding, swelling, & breathing problems  Call your doctor with belly & leg swelling, shortness of breath, bruising or bleeding easily, feeling full, tired, trouble getting enough or too much sleep, yellowing of skin or whites of eye, sudden confusion, or coma  Causes may include heavy alcohol use, hepatitis B or C, or fatty liver.    You can help yourself by avoiding alcohol, speak with your doctor before starting on any new medication, herbs, vitamins, or supplements which may cause more damage to the liver  Treatment includes treating the cause, reducing blood pressure, low salt diet, diuretics, belly fluid drainage, treating infections, getting vaccinations to prevent common infections, &/or lactulose to improve confusion  It is important to get help if you have an alcohol problem, use condoms when having sex, and nor sharing drug needles if this applies to you  Follow up with your PCP in 1-2 weeks.  Follow-up with hepatology outpatient as scheduled for EGD.

## 2023-08-18 NOTE — DISCHARGE NOTE NURSING/CASE MANAGEMENT/SOCIAL WORK - PATIENT PORTAL LINK FT
You can access the FollowMyHealth Patient Portal offered by [facility] by registering at the following website: [email or web address]. By joining [facility]’s FollowMyHealth portal, you will also be able to view your health information using other applications (apps) compatible with our system.

## 2023-08-18 NOTE — DISCHARGE NOTE PROVIDER - NSDCFUADDAPPT_GEN_ALL_CORE_FT
APPTS ARE READY TO BE MADE: [ X] YES    Best Family or Patient Contact (if needed):    Additional Information about above appointments (if needed):    1: Follow-up with hepatology outpatient as scheduled for EGD, they will call you.   2:   3:     Other comments or requests:    APPTS ARE READY TO BE MADE: [ X] YES    Best Family or Patient Contact (if needed):    Additional Information about above appointments (if needed):    1:   2:   3:     Other comments or requests:    APPTS ARE READY TO BE MADE: [ X] YES    Best Family or Patient Contact (if needed):    Additional Information about above appointments (if needed):    1:   2:   3:     Other comments or requests:   Patient was previously scheduled with Dr. Finnegan on 8/21 at [address] [place] [address] at 11:00am   Patient was scheduled on 9/7 at 9:30am at [address] [address] with JOANA Muniz

## 2023-08-18 NOTE — DISCHARGE NOTE PROVIDER - NSDCFUSCHEDAPPT_GEN_ALL_CORE_FT
Cheyanne Muniz Physician [facility]  HEPATOLOGY [facility]  Scheduled Appointment: 09/07/2023

## 2023-08-18 NOTE — PROGRESS NOTE ADULT - SUBJECTIVE AND OBJECTIVE BOX
Bobby Williamson is a 53 year old male here for  Chief Complaint   Patient presents with   • Cancer     Denies latex allergy or sensitivity.    Medication verified, no changes.  PCP and Pharmacy verified.    Social History     Tobacco Use   Smoking Status Never Smoker   Smokeless Tobacco Never Used     Advance Directives Filed: No    ECO - Fully active, able to carry on all predisease activities without restrictions.    Height: No.  Weight:Yes, shoes on.    REVIEW OF SYSTEMS  GENERAL:  Patient denies headache, fevers, chills, night sweats, excessive fatigue, change in appetite, weight loss, dizziness  ALLERGIC/IMMUNOLOGIC: Verified allergies: Yes  EYES:  Patient denies significant visual difficulties, double vision, blurred vision  ENT/MOUTH: Patient denies problems with hearing, sore throat, sinus drainage, mouth sores  ENDOCRINE:  Patient denies diabetes, thyroid disease, hormone replacement, hot flashes  HEMATOLOGIC/LYMPHATIC: Patient denies easy bruising, bleeding, tender lymph nodes, swollen lymph nodes  BREASTS: Patient denies abnormal masses of breast, nipple discharge, pain  RESPIRATORY:  Patient denies lung pain with breathing, cough, coughing up blood, shortness of breath  CARDIOVASCULAR:  Patient denies anginal chest pain, palpitations, shortness of breath when lying flat, peripheral edema  GASTROINTESTINAL: Patient denies abdominal pain , nausea, vomiting, diarrhea, GI bleeding, constipation, change in bowel habits, heartburn, sensation of feeling full, difficulty swallowing  : Patient denies blood in the urine, burning with urination, frequency, urgency, hesitancy, incontinence  MUSCULOSKELETAL:  Patient denies joint pain, bone pain, joint swelling, redness, decreased range of motion  SKIN:  Patient denies chronic rashes, inflammation, ulcerations, skin changes, itching  NEUROLOGIC:  Patient denies loss of balance, areas of focal weakness, abnormal gait, sensory problems, numbness,  Interval Events:   -HA this morning and not feeling well as was up all night because of neighbor  -reports she is peeing a lot and legs are greatly improving   -Diag para yesterday - no SBP, SAAG 2.1, fluid prot 0.7    Hospital Medications:  dextrose 5%. 1000 milliLiter(s) IV Continuous <Continuous>  dextrose 5%. 1000 milliLiter(s) IV Continuous <Continuous>  dextrose 50% Injectable 25 Gram(s) IV Push once  dextrose 50% Injectable 12.5 Gram(s) IV Push once  dextrose 50% Injectable 25 Gram(s) IV Push once  dextrose Oral Gel 15 Gram(s) Oral once PRN  furosemide    Tablet 40 milliGRAM(s) Oral daily  glucagon  Injectable 1 milliGRAM(s) IntraMuscular once  insulin lispro (ADMELOG) corrective regimen sliding scale   SubCutaneous three times a day before meals  insulin lispro (ADMELOG) corrective regimen sliding scale   SubCutaneous at bedtime  metroNIDAZOLE  IVPB 500 milliGRAM(s) IV Intermittent every 8 hours  spironolactone 100 milliGRAM(s) Oral daily      ROS: All system reviewed and negative except as mentioned above.    PHYSICAL EXAM:   Vital Signs:  Vital Signs Last 24 Hrs  T(C): 36.7 (18 Aug 2023 05:17), Max: 36.9 (17 Aug 2023 13:25)  T(F): 98.1 (18 Aug 2023 05:17), Max: 98.5 (17 Aug 2023 14:26)  HR: 79 (18 Aug 2023 05:17) (79 - 96)  BP: 100/60 (18 Aug 2023 05:17) (99/56 - 130/76)  BP(mean): --  RR: 18 (18 Aug 2023 05:17) (18 - 18)  SpO2: 95% (18 Aug 2023 05:17) (94% - 99%)    Parameters below as of 18 Aug 2023 05:17  Patient On (Oxygen Delivery Method): room air      Daily     Daily     GENERAL:  NAD, Appears stated age  HEENT:  NC/AT,  conjunctivae clear and pink, sclera -anicteric  CHEST:  Normal Effort, Breath sounds clear  HEART:  RRR, S1 + S2, no murmurs  ABDOMEN:  Soft, non-tender, enlarged w/ ?ascites  EXTREMITIES:  no cyanosis, 1+ edema b/l  SKIN:  Warm & Dry. No rash or erythema  NEURO:  Alert, oriented, no focal deficit, no asterixis    LABS:                        11.4   2.84  )-----------( 67       ( 18 Aug 2023 07:11 )             34.5     Mean Cell Volume: 88.5 fl (08-18-23 @ 07:11)    08-18    141  |  107  |  <4<L>  ----------------------------<  98  4.1   |  23  |  0.42<L>    Ca    8.1<L>      18 Aug 2023 07:13    TPro  5.6<L>  /  Alb  2.7<L>  /  TBili  1.1  /  DBili  x   /  AST  49<H>  /  ALT  34  /  AlkPhos  83  08-18    LIVER FUNCTIONS - ( 18 Aug 2023 07:13 )  Alb: 2.7 g/dL / Pro: 5.6 g/dL / ALK PHOS: 83 U/L / ALT: 34 U/L / AST: 49 U/L / GGT: x           PT/INR - ( 18 Aug 2023 07:11 )   PT: 18.4 sec;   INR: 1.70 ratio         PTT - ( 18 Aug 2023 07:11 )  PTT:33.4 sec  Urinalysis Basic - ( 18 Aug 2023 07:13 )    Color: x / Appearance: x / SG: x / pH: x  Gluc: 98 mg/dL / Ketone: x  / Bili: x / Urobili: x   Blood: x / Protein: x / Nitrite: x   Leuk Esterase: x / RBC: x / WBC x   Sq Epi: x / Non Sq Epi: x / Bacteria: x                              11.4   2.84  )-----------( 67       ( 18 Aug 2023 07:11 )             34.5                         10.6   2.83  )-----------( 55       ( 17 Aug 2023 06:48 )             32.2                         11.5   2.80  )-----------( 55       ( 16 Aug 2023 10:41 )             34.9                         12.2   3.29  )-----------( 56       ( 15 Aug 2023 14:11 )             37.4       Imaging: Images reviewed.     tingling  PSYCHIATRIC: Patient denies insomnia, depression, anxiety

## 2023-08-18 NOTE — DISCHARGE NOTE NURSING/CASE MANAGEMENT/SOCIAL WORK - NSDCPEFALRISK_GEN_ALL_CORE
For information on Fall & Injury Prevention, visit: [email or web address] OR  [email or web address] OR  https://www.cdc.gov/steadi/patient.html

## 2023-08-20 LAB
CULTURE RESULTS: SIGNIFICANT CHANGE UP
SPECIMEN SOURCE: SIGNIFICANT CHANGE UP

## 2023-08-21 LAB
% ALBUMIN: 50.9 % — SIGNIFICANT CHANGE UP
% ALPHA 1: 4.9 % — SIGNIFICANT CHANGE UP
% ALPHA 2: 8.8 % — SIGNIFICANT CHANGE UP
% BETA: 10.7 % — SIGNIFICANT CHANGE UP
% GAMMA: 24.7 % — SIGNIFICANT CHANGE UP
% M SPIKE: SIGNIFICANT CHANGE UP
ALBUMIN SERPL ELPH-MCNC: 3.1 G/DL — LOW (ref 3.6–5.5)
ALBUMIN/GLOB SERPL ELPH: 1 RATIO — SIGNIFICANT CHANGE UP
ALPHA1 GLOB SERPL ELPH-MCNC: 0.3 G/DL — SIGNIFICANT CHANGE UP (ref 0.1–0.4)
ALPHA2 GLOB SERPL ELPH-MCNC: 0.5 G/DL — SIGNIFICANT CHANGE UP (ref 0.5–1)
B-GLOBULIN SERPL ELPH-MCNC: 0.7 G/DL — SIGNIFICANT CHANGE UP (ref 0.5–1)
GAMMA GLOBULIN: 1.5 G/DL — SIGNIFICANT CHANGE UP (ref 0.6–1.6)
INTERPRETATION SERPL IFE-IMP: SIGNIFICANT CHANGE UP
M-SPIKE: SIGNIFICANT CHANGE UP (ref 0–0)
PROT PATTERN SERPL ELPH-IMP: SIGNIFICANT CHANGE UP

## 2023-08-22 LAB
CULTURE RESULTS: SIGNIFICANT CHANGE UP
CULTURE RESULTS: SIGNIFICANT CHANGE UP
SPECIMEN SOURCE: SIGNIFICANT CHANGE UP
SPECIMEN SOURCE: SIGNIFICANT CHANGE UP

## 2023-08-23 LAB — NON-GYNECOLOGICAL CYTOLOGY STUDY: SIGNIFICANT CHANGE UP

## 2023-08-26 ENCOUNTER — EMERGENCY (EMERGENCY)
Facility: HOSPITAL | Age: 58
LOS: 1 days | Discharge: HOME | End: 2023-08-26
Attending: EMERGENCY MEDICINE
Payer: COMMERCIAL

## 2023-08-26 ENCOUNTER — TRANSCRIPTION ENCOUNTER (OUTPATIENT)
Age: 58
End: 2023-08-26

## 2023-08-26 VITALS
SYSTOLIC BLOOD PRESSURE: 126 MMHG | RESPIRATION RATE: 16 BRPM | HEART RATE: 97 BPM | TEMPERATURE: 98 F | HEIGHT: 62 IN | OXYGEN SATURATION: 98 % | DIASTOLIC BLOOD PRESSURE: 84 MMHG | WEIGHT: 173.94 LBS

## 2023-08-26 LAB
ALBUMIN SERPL ELPH-MCNC: 3.1 G/DL — LOW (ref 3.3–5)
ALP SERPL-CCNC: 107 U/L — SIGNIFICANT CHANGE UP (ref 40–120)
ALT FLD-CCNC: 33 U/L — SIGNIFICANT CHANGE UP (ref 10–45)
ANION GAP SERPL CALC-SCNC: 12 MMOL/L — SIGNIFICANT CHANGE UP (ref 5–17)
APPEARANCE UR: CLEAR — SIGNIFICANT CHANGE UP
AST SERPL-CCNC: 66 U/L — HIGH (ref 10–40)
BACTERIA # UR AUTO: NEGATIVE — SIGNIFICANT CHANGE UP
BASOPHILS # BLD AUTO: 0.04 K/UL — SIGNIFICANT CHANGE UP (ref 0–0.2)
BASOPHILS NFR BLD AUTO: 1 % — SIGNIFICANT CHANGE UP (ref 0–2)
BILIRUB SERPL-MCNC: 0.9 MG/DL — SIGNIFICANT CHANGE UP (ref 0.2–1.2)
BILIRUB UR-MCNC: NEGATIVE — SIGNIFICANT CHANGE UP
BUN SERPL-MCNC: 12 MG/DL — SIGNIFICANT CHANGE UP (ref 7–23)
CALCIUM SERPL-MCNC: 8.7 MG/DL — SIGNIFICANT CHANGE UP (ref 8.4–10.5)
CHLORIDE SERPL-SCNC: 95 MMOL/L — LOW (ref 96–108)
CK SERPL-CCNC: 259 U/L — HIGH (ref 25–170)
CO2 SERPL-SCNC: 20 MMOL/L — LOW (ref 22–31)
COLOR SPEC: SIGNIFICANT CHANGE UP
CREAT ?TM UR-MCNC: 19 MG/DL — SIGNIFICANT CHANGE UP
CREAT SERPL-MCNC: 0.46 MG/DL — LOW (ref 0.5–1.3)
DIFF PNL FLD: NEGATIVE — SIGNIFICANT CHANGE UP
EGFR: 109 ML/MIN/1.73M2 — SIGNIFICANT CHANGE UP
EOSINOPHIL # BLD AUTO: 0.18 K/UL — SIGNIFICANT CHANGE UP (ref 0–0.5)
EOSINOPHIL NFR BLD AUTO: 4.6 % — SIGNIFICANT CHANGE UP (ref 0–6)
EPI CELLS # UR: 1 /HPF — SIGNIFICANT CHANGE UP
GLUCOSE SERPL-MCNC: 290 MG/DL — HIGH (ref 70–99)
GLUCOSE UR QL: NEGATIVE — SIGNIFICANT CHANGE UP
HCT VFR BLD CALC: 38.1 % — SIGNIFICANT CHANGE UP (ref 34.5–45)
HGB BLD-MCNC: 12.6 G/DL — SIGNIFICANT CHANGE UP (ref 11.5–15.5)
HYALINE CASTS # UR AUTO: 0 /LPF — SIGNIFICANT CHANGE UP (ref 0–2)
IMM GRANULOCYTES NFR BLD AUTO: 0.3 % — SIGNIFICANT CHANGE UP (ref 0–0.9)
KETONES UR-MCNC: NEGATIVE — SIGNIFICANT CHANGE UP
LEUKOCYTE ESTERASE UR-ACNC: NEGATIVE — SIGNIFICANT CHANGE UP
LYMPHOCYTES # BLD AUTO: 1.17 K/UL — SIGNIFICANT CHANGE UP (ref 1–3.3)
LYMPHOCYTES # BLD AUTO: 30.1 % — SIGNIFICANT CHANGE UP (ref 13–44)
MAGNESIUM SERPL-MCNC: 1.7 MG/DL — SIGNIFICANT CHANGE UP (ref 1.6–2.6)
MCHC RBC-ENTMCNC: 29.1 PG — SIGNIFICANT CHANGE UP (ref 27–34)
MCHC RBC-ENTMCNC: 33.1 GM/DL — SIGNIFICANT CHANGE UP (ref 32–36)
MCV RBC AUTO: 88 FL — SIGNIFICANT CHANGE UP (ref 80–100)
MONOCYTES # BLD AUTO: 0.45 K/UL — SIGNIFICANT CHANGE UP (ref 0–0.9)
MONOCYTES NFR BLD AUTO: 11.6 % — SIGNIFICANT CHANGE UP (ref 2–14)
NEUTROPHILS # BLD AUTO: 2.04 K/UL — SIGNIFICANT CHANGE UP (ref 1.8–7.4)
NEUTROPHILS NFR BLD AUTO: 52.4 % — SIGNIFICANT CHANGE UP (ref 43–77)
NITRITE UR-MCNC: NEGATIVE — SIGNIFICANT CHANGE UP
NRBC # BLD: 0 /100 WBCS — SIGNIFICANT CHANGE UP (ref 0–0)
OSMOLALITY SERPL: 280 MOSMOL/KG — SIGNIFICANT CHANGE UP (ref 280–301)
OSMOLALITY UR: 187 MOS/KG — LOW (ref 300–900)
PH UR: 6.5 — SIGNIFICANT CHANGE UP (ref 5–8)
PHOSPHATE SERPL-MCNC: 3.1 MG/DL — SIGNIFICANT CHANGE UP (ref 2.5–4.5)
PLATELET # BLD AUTO: 66 K/UL — LOW (ref 150–400)
POTASSIUM SERPL-MCNC: 4.6 MMOL/L — SIGNIFICANT CHANGE UP (ref 3.5–5.3)
POTASSIUM SERPL-SCNC: 4.6 MMOL/L — SIGNIFICANT CHANGE UP (ref 3.5–5.3)
POTASSIUM UR-SCNC: 19 MMOL/L — SIGNIFICANT CHANGE UP
PROT ?TM UR-MCNC: <7 MG/DL — SIGNIFICANT CHANGE UP (ref 0–12)
PROT SERPL-MCNC: 6.8 G/DL — SIGNIFICANT CHANGE UP (ref 6–8.3)
PROT UR-MCNC: NEGATIVE — SIGNIFICANT CHANGE UP
PROT/CREAT UR-RTO: <0.4 RATIO — HIGH (ref 0–0.2)
RBC # BLD: 4.33 M/UL — SIGNIFICANT CHANGE UP (ref 3.8–5.2)
RBC # FLD: 13.3 % — SIGNIFICANT CHANGE UP (ref 10.3–14.5)
RBC CASTS # UR COMP ASSIST: 1 /HPF — SIGNIFICANT CHANGE UP (ref 0–4)
SODIUM SERPL-SCNC: 127 MMOL/L — LOW (ref 135–145)
SODIUM UR-SCNC: 25 MMOL/L — SIGNIFICANT CHANGE UP
SP GR SPEC: 1.01 — LOW (ref 1.01–1.02)
UROBILINOGEN FLD QL: NEGATIVE — SIGNIFICANT CHANGE UP
UUN UR-MCNC: 245 MG/DL — SIGNIFICANT CHANGE UP
WBC # BLD: 3.89 K/UL — SIGNIFICANT CHANGE UP (ref 3.8–10.5)
WBC # FLD AUTO: 3.89 K/UL — SIGNIFICANT CHANGE UP (ref 3.8–10.5)
WBC UR QL: 0 /HPF — SIGNIFICANT CHANGE UP (ref 0–5)

## 2023-08-26 PROCEDURE — 99284 EMERGENCY DEPT VISIT MOD MDM: CPT

## 2023-08-26 PROCEDURE — 84300 ASSAY OF URINE SODIUM: CPT

## 2023-08-26 PROCEDURE — 85025 COMPLETE CBC W/AUTO DIFF WBC: CPT

## 2023-08-26 PROCEDURE — 80053 COMPREHEN METABOLIC PANEL: CPT

## 2023-08-26 PROCEDURE — 84133 ASSAY OF URINE POTASSIUM: CPT

## 2023-08-26 PROCEDURE — 83935 ASSAY OF URINE OSMOLALITY: CPT

## 2023-08-26 PROCEDURE — 84156 ASSAY OF PROTEIN URINE: CPT

## 2023-08-26 PROCEDURE — 81001 URINALYSIS AUTO W/SCOPE: CPT

## 2023-08-26 PROCEDURE — 84540 ASSAY OF URINE/UREA-N: CPT

## 2023-08-26 PROCEDURE — 99283 EMERGENCY DEPT VISIT LOW MDM: CPT | Mod: 25

## 2023-08-26 PROCEDURE — 93005 ELECTROCARDIOGRAM TRACING: CPT

## 2023-08-26 PROCEDURE — 83735 ASSAY OF MAGNESIUM: CPT

## 2023-08-26 PROCEDURE — 83930 ASSAY OF BLOOD OSMOLALITY: CPT

## 2023-08-26 PROCEDURE — 82570 ASSAY OF URINE CREATININE: CPT

## 2023-08-26 PROCEDURE — 82550 ASSAY OF CK (CPK): CPT

## 2023-08-26 PROCEDURE — 84100 ASSAY OF PHOSPHORUS: CPT

## 2023-08-26 NOTE — ED PROVIDER NOTE - PROGRESS NOTE DETAILS
Dany Harris, DO PGY-3: Labs show thrombocytopenia which is baseline as per previous labs, corrected sodium is 130 for the hyperglycemia other labs nonactionable.  Patient states she did not take her diabetes medicine today.  Patient feels well and would like to go home.  Will discharge patient to follow-up with PMD.

## 2023-08-26 NOTE — ED ADULT TRIAGE NOTE - CHIEF COMPLAINT QUOTE
had a call from PMD to go to ED for an abnormal lab results done yesterday. c/o easy fatigability. On lasix 40mg and spironolactone

## 2023-08-26 NOTE — ED PROVIDER NOTE - OBJECTIVE STATEMENT
58-year-old female past medical history of Duran cirrhosis, diabetes presents to the ED sent in by her PMD for hyponatremia to 129 and elevated CK level of 318.  Patient denies current complaints but did have muscle spasms 2 days ago.  Patient was recently discharged from here after a long hospitalization for colitis and discharged on Flagyl.  Denies nausea, vomiting, fever, chest pain, abdominal pain, shortness of breath, dysuria, diarrhea

## 2023-08-26 NOTE — ED PROVIDER NOTE - NSFOLLOWUPINSTRUCTIONS_ED_ALL_ED_FT
1.  Please follow with your primary care doctor within the next week.  2.  Please take your diabetes medication as prescribed.  3.  Please keep yourself well-hydrated.    Hyponatremia  Hyponatremia is when the amount of salt (sodium) in a person's blood is too low. When sodium levels are low, the cells absorb extra water, which causes them to swell. The swelling happens throughout the body, but it mostly affects the brain.    What are the causes?  This condition may be caused by:  Certain medical conditions, such as:  Heart, kidney, or liver problems.  Thyroid problems.  Adrenal gland problems.  Metabolic conditions, such as Remi's disease or syndrome of inappropriate antidiuresis (SIAD).  Excessive vomiting, diarrhea, or sweating.  Certain medicines or illegal drugs.  Fluids given through an IV.  What increases the risk?  You are more likely to develop this condition if you:  Have certain medical conditions such as heart, kidney, or liver failure.  Have a medical condition that causes frequent or excessive diarrhea.  Participate in intense physical activities, such as marathon running.  Take certain medicines that affect the sodium and fluid balance in the blood. Some of these medicine types include:  Diuretics.  NSAIDs, such as ibuprofen.  Some opioid pain medicines.  Some antidepressants.  Some seizure prevention medicines.  What are the signs or symptoms?  Symptoms of this condition include:  Headache.  Nausea and vomiting.  Being very tired (lethargic).  Muscle weakness and cramping.  Loss of appetite.  Feeling weak or light-headed.  Severe symptoms of this condition include:  Confusion.  Agitation.  Having a rapid heart rate.  Fainting.  Seizures.  Coma.  How is this diagnosed?  This condition is diagnosed based on:  A physical exam.  Your medical history.  Tests, including:  Blood tests.  Urine tests.  How is this treated?  A person receiving fluids through an IV in the arm. The person is in a hospital bed.  Treatment for this condition depends on the cause. Treatment may include:  Getting fluids through an IV that is inserted into one of your veins.  Medicines to correct the sodium imbalance. If medicines are causing the condition, the medicines will need to be adjusted.  Limiting your water or fluid intake to get the correct sodium balance, in certain cases.  Monitoring in the hospital to closely watch your symptoms for improvement.  Follow these instructions at home:  A sign showing that a person should not drink alcohol.  Take over-the-counter and prescription medicines only as told by your health care provider. Many medicines can make this condition worse. Talk with your health care provider about any medicines that you are currently taking.  Do not drink alcohol.  Keep all follow-up visits. This is important.  Contact a health care provider if:  You develop worsening nausea, fatigue, headache, confusion, or weakness.  Your symptoms go away and then return.  Get help right away if:  You have a seizure.  You faint.  You have ongoing diarrhea or vomiting.  Summary  Hyponatremia is when the amount of salt (sodium) in your blood is too low.  When sodium levels are low, your cells absorb extra water, which causes them to swell.  The swelling happens throughout the body, but it mostly affects the brain.  Treatment for this condition depends on the cause. It may include receiving IV fluids, taking or adjusting medicines, limiting fluid intake, and monitoring in the hospital.  This information is not intended to replace advice given to you by your health care provider. Make sure you discuss any questions you have with your health care provider.

## 2023-08-26 NOTE — ED ADULT NURSE NOTE - NSFALLUNIVINTERV_ED_ALL_ED
Bed/Stretcher in lowest position, wheels locked, appropriate side rails in place/Call bell, personal items and telephone in reach/Instruct patient to call for assistance before getting out of bed/chair/stretcher/Non-slip footwear applied when patient is off stretcher/[place] to call system/Physically safe environment - no spills, clutter or unnecessary equipment/Purposeful proactive rounding/Room/bathroom lighting operational, light cord in reach

## 2023-08-26 NOTE — ED ADULT NURSE NOTE - OBJECTIVE STATEMENT
58Y female, A&Ox4, ambulitory w/o assistance cirrhosis, diabetes presents to the ED sent in by her PMD for hyponatremia to 129 and elevated CK level of 318.  Patient denies current complaints but endorses having muscle spasms 2 days ago.  Patient was recently discharged from here after a long hospitalization for colitis and discharged on Flagyl.  Denies nausea, vomiting, fever, chest pain, abdominal pain, shortness of breath, dysuria, diarrhea

## 2023-08-26 NOTE — ED PROVIDER NOTE - ATTENDING CONTRIBUTION TO CARE
Attending MD Jesusita Ying:  I personally have seen and examined this patient.  Resident note reviewed and agree on plan of care and except where noted.  See HPI, PE, and MDM for details.

## 2023-08-26 NOTE — ED PROVIDER NOTE - PATIENT PORTAL LINK FT
You can access the FollowMyHealth Patient Portal offered by [facility] by registering at the following website: [email or web address]. By joining [employer]’s FollowMyHealth portal, you will also be able to view your health information using other applications (apps) compatible with our system.

## 2023-08-26 NOTE — ED PROVIDER NOTE - PHYSICAL EXAMINATION
GEN: Patient awake alert NAD.   HEENT: normocephalic, atraumatic, EOMI, no scleral icterus, moist MM  CARDIAC: RRR, S1, S2, no murmur.   PULM: CTA B/L no wheeze, rhonchi, rales.   ABD: soft NT, ND, no rebound no guarding, no CVA tenderness.   MSK: Moving all extremities, no edema.   NEURO: A&Ox3, no focal neurological deficits  SKIN: warm, dry, no rash.

## 2023-08-26 NOTE — ED PROVIDER NOTE - CLINICAL SUMMARY MEDICAL DECISION MAKING FREE TEXT BOX
Dany Harris, DO PGY-3: 58-year-old female past medical history of Duran cirrhosis, diabetes presents to the ED sent in by her PMD for hyponatremia to 129 and elevated CK level of 318.  Patient denies current complaints but did have muscle spasms 2 days ago.  Patient was recently discharged from here after a long hospitalization for colitis and discharged on Flagyl.  Denies nausea, vomiting, fever, chest pain, abdominal pain, shortness of breath, dysuria, diarrhea. Patient well-appearing, hemodynamically stable nonfocal exam.  Differential includes not limited to electrolyte abnormality, low suspicion for rhabdo.  Plan for repeat labs and reassess. Dany Harris,  PGY-3: 58-year-old female past medical history of Duran cirrhosis, diabetes presents to the ED sent in by her PMD for hyponatremia to 129 and elevated CK level of 318.  Patient denies current complaints but did have muscle spasms 2 days ago.  Patient was recently discharged from here after a long hospitalization for colitis and discharged on Flagyl.  Denies nausea, vomiting, fever, chest pain, abdominal pain, shortness of breath, dysuria, diarrhea. Patient well-appearing, hemodynamically stable nonfocal exam.  Differential includes not limited to electrolyte abnormality, low suspicion for rhabdo.  Plan for repeat labs and reassess.  Attending Jesusita Ying: 58-year-old female history of, diabetes, asthma presenting with concern for abnormal lab.  Patient was recently admitted and discharged from the hospital and started on a diuretic.  Patient went to follow-up with her primary care physician and had blood work taken for routine visit which showed evidence of mild hyponatremia and slightly elevated creatinine kinase in the 300s.  Patient was sent to the emergency department for further evaluation.  Upon arrival patient awake and alert following commands.  Abdomen is soft and nontender.  Patient does endorse frequent water use and drinking large amounts of water since being discharged as well as starting on a diuretic.  On exam patient well-appearing.  Patient is urinating without any difficulty.  Repeat blood work checked showing evidence of mild hyponatremia however sugar was a little bit elevated so when corrected was within normal limits.  No evidence of DKA or hyperosmolar crisis.  Suspect the hyponatremia could be secondary to possible new onset diuretic use versus possible polydipsia.  Patient denies any neurologic changes.  Compartments soft on exam and patient does not think that she is on a statin.  No muscle aches today.  Discussed with patient needs to follow-up with her primary care physician to have her blood work checked as well as to monitor her blood sugar.  We will monitor her fluid intake as well.  Plan to DC with close follow-up.

## 2023-08-28 PROBLEM — Z00.00 ENCOUNTER FOR PREVENTIVE HEALTH EXAMINATION: Status: ACTIVE | Noted: 2023-08-28

## 2023-09-07 ENCOUNTER — APPOINTMENT (OUTPATIENT)
Dept: HEPATOLOGY | Facility: CLINIC | Age: 58
End: 2023-09-07
Payer: COMMERCIAL

## 2023-09-07 VITALS
OXYGEN SATURATION: 98 % | TEMPERATURE: 98 F | DIASTOLIC BLOOD PRESSURE: 73 MMHG | BODY MASS INDEX: 30.3 KG/M2 | SYSTOLIC BLOOD PRESSURE: 123 MMHG | WEIGHT: 171 LBS | HEIGHT: 63 IN | HEART RATE: 83 BPM

## 2023-09-07 DIAGNOSIS — K75.81 NONALCOHOLIC STEATOHEPATITIS (NASH): ICD-10-CM

## 2023-09-07 DIAGNOSIS — R18.8 OTHER ASCITES: ICD-10-CM

## 2023-09-07 DIAGNOSIS — K74.60 NONALCOHOLIC STEATOHEPATITIS (NASH): ICD-10-CM

## 2023-09-07 PROCEDURE — 99204 OFFICE O/P NEW MOD 45 MIN: CPT

## 2023-09-08 NOTE — REVIEW OF SYSTEMS
[Negative] : Heme/Lymph [Fever] : no fever [Chills] : no chills [Chest Pain] : no chest pain [Cough] : no cough [Abdominal Pain] : no abdominal pain [Diarrhea] : no diarrhea [Melena] : no melena [Confused] : no confusion

## 2023-09-08 NOTE — ASSESSMENT
[FreeTextEntry1] : 58-year-old female history of, diabetes, asthma here to for to establish care for decompensated MADDOX cirrhosis.    #Decompensated MADDOX cirrhosis -Was told she has fatty liver with early cirrhosis about 10 yrs ago based on imaging. Developed ascites for the first time in July/Aug.    > Esophageal / gastric varices - Reports last EGD was 5 yrs ago. Needs a repeat for EV screening.   > Ascites - New since July/Aug. Diagnostic para 8/16/23 100 mL removed. No SBP, SAAG 2.1, fluid prot 0.7.  Was prescribed furosemide 40 mg daily plus spironolactone 100 mg daily however stopped furosemide 4 days ago on her own.  Pt was counseled to adhere to a low sodium (<2 grams of sodium per day) diet by: avoiding adding any salt to meals (removing the salt shaker from the table); eliminating salty foods from his diet; eating more home-cooked meals; choosing fresh or frozen, not canned, vegetables and fruits; and reading ingredient and food labels to choose low sodium foods.   > Hepatic encephalopathy - none on exam today    > HCC screening - Reports none on last abdo CT in July, will fax me a copy   > Portal vein thrombosis -unknown   > Transplant candidate -MELD Na low  #Cholitis/ Ecoli Giardia Lamsnehaia Advised to follow up with GI. Reports that she is due for repeat colonoscopy b/c last one was 10 yrs ago. Will get EGD to screen for EV at the same time.   Plan: BW today and will review trw to discuss diuretics adjustment, forward abdo CT done in July, RTC 3 weeks

## 2023-09-08 NOTE — HISTORY OF PRESENT ILLNESS
[Hospitalization] : ~he/she~ was hospitalized [de-identified] : 8/15-8/18/23  [de-identified] : 58-year-old female history of, diabetes, asthma here to for to establish care for decompensated MADDOX cirrhosis.  Developed elevated LFTs about 10 yrs ago and was seen by GI, Dr. Rangel. Was told that she has fatty liver with early cirrhosis. Had EGD/colonoscopy at the time, (per patient, w/ one small ulcer treated w/PPI) and was placed on ursodiol X 1 year for hepatomegaly. When she followed up a year later she was told her hepatomegaly was slightly better. Since then no issues and has not followed w/GI or hepatology. Had repeat EGD 5 yrs ago and was also told everything fine. She traveled to [facility] in 2023 and on returning, had 1day of diarrhea and vomiting which self resolved but was followed by light beiged stools for several weeks. Few weeks later she developed recurrent non bloody watery diarrhea up to 15x/day, including nightime sxs. Developed abdominal distension about 3-4 weeks ago that has progressed substantially. She was started on diuretics. She was recently hospitalized at [facility] from 8/15-23 for hyponatremia and slightly elevated creatinine kinase in the 300s.  Had paracentesis, no SBP, SAAG 2.1, fluid prot 0.7. Was pos for Ecoli Giardia Lamdlia, completed treatment.   She has stopped on her own furosemide about 4 days ago bc she felt she was taking too much diuretics and was making her feels dizzy and foggy. She felt better after stopping furosemide and has been only taking spironolactone 100 mg daily. No abdo pain, hematochezia or melena. No more diarrhea. Denies any confusion.   Denies any hx of alcohol use. Mother reportedly had MADDOX cirrhosis and  from HCC.  Works as sono tech.

## 2023-09-08 NOTE — PHYSICAL EXAM
[General Appearance - Alert] : alert [General Appearance - In No Acute Distress] : in no acute distress [Sclera] : the sclera and conjunctiva were normal [] : no respiratory distress [Respiration, Rhythm And Depth] : normal respiratory rhythm and effort [Abdomen Soft] : soft [Abdomen Tenderness] : non-tender [Oriented To Time, Place, And Person] : oriented to person, place, and time [Affect] : the affect was normal [Scleral Icterus] : No Scleral Icterus [Ascites Fluid Wave] : no ascites fluid wave [Ascites Tense] : ascites is not tense [Asterixis] : no asterixis observed [Jaundice] : No jaundice [Palmar Erythema] : no Palmar Erythema [FreeTextEntry1] : ventral hernia

## 2023-09-11 ENCOUNTER — NON-APPOINTMENT (OUTPATIENT)
Age: 58
End: 2023-09-11

## 2023-09-11 LAB
ALBUMIN SERPL ELPH-MCNC: 3.9 G/DL
ALP BLD-CCNC: 185 U/L
ALT SERPL-CCNC: 48 U/L
ANION GAP SERPL CALC-SCNC: 13 MMOL/L
AST SERPL-CCNC: 71 U/L
BILIRUB DIRECT SERPL-MCNC: 0.3 MG/DL
BILIRUB SERPL-MCNC: 0.6 MG/DL
BUN SERPL-MCNC: 13 MG/DL
CALCIUM SERPL-MCNC: 9.6 MG/DL
CHLORIDE SERPL-SCNC: 97 MMOL/L
CO2 SERPL-SCNC: 20 MMOL/L
CREAT SERPL-MCNC: 0.53 MG/DL
EGFR: 106 ML/MIN/1.73M2
HCT VFR BLD CALC: 41.2 %
HGB BLD-MCNC: 13.4 G/DL
INR PPP: 1.07 RATIO
MCHC RBC-ENTMCNC: 29.3 PG
MCHC RBC-ENTMCNC: 32.5 GM/DL
MCV RBC AUTO: 90 FL
PLATELET # BLD AUTO: NORMAL K/UL
POTASSIUM SERPL-SCNC: 4.7 MMOL/L
PROT SERPL-MCNC: 7 G/DL
PT BLD: 12.1 SEC
RBC # BLD: 4.58 M/UL
RBC # FLD: 14.7 %
SODIUM SERPL-SCNC: 130 MMOL/L
WBC # FLD AUTO: 4.03 K/UL

## 2023-09-16 LAB
CULTURE RESULTS: SIGNIFICANT CHANGE UP
SPECIMEN SOURCE: SIGNIFICANT CHANGE UP

## 2023-09-23 ENCOUNTER — EMERGENCY (EMERGENCY)
Facility: HOSPITAL | Age: 58
LOS: 1 days | Discharge: HOME | End: 2023-09-23
Attending: STUDENT IN AN ORGANIZED HEALTH CARE EDUCATION/TRAINING PROGRAM
Payer: COMMERCIAL

## 2023-09-23 VITALS
RESPIRATION RATE: 19 BRPM | DIASTOLIC BLOOD PRESSURE: 72 MMHG | WEIGHT: 173.94 LBS | OXYGEN SATURATION: 100 % | HEIGHT: 62 IN | SYSTOLIC BLOOD PRESSURE: 132 MMHG | TEMPERATURE: 98 F | HEART RATE: 81 BPM

## 2023-09-23 VITALS
SYSTOLIC BLOOD PRESSURE: 141 MMHG | DIASTOLIC BLOOD PRESSURE: 100 MMHG | RESPIRATION RATE: 18 BRPM | TEMPERATURE: 98 F | HEART RATE: 88 BPM | OXYGEN SATURATION: 100 %

## 2023-09-23 LAB
APTT BLD: 27.8 SEC — SIGNIFICANT CHANGE UP (ref 24.5–35.6)
BASOPHILS # BLD AUTO: 0.05 K/UL — SIGNIFICANT CHANGE UP (ref 0–0.2)
BASOPHILS NFR BLD AUTO: 1.7 % — SIGNIFICANT CHANGE UP (ref 0–2)
CK SERPL-CCNC: 128 U/L — SIGNIFICANT CHANGE UP (ref 25–170)
EOSINOPHIL # BLD AUTO: 0.28 K/UL — SIGNIFICANT CHANGE UP (ref 0–0.5)
EOSINOPHIL NFR BLD AUTO: 8.6 % — HIGH (ref 0–6)
HCT VFR BLD CALC: 37.7 % — SIGNIFICANT CHANGE UP (ref 34.5–45)
HGB BLD-MCNC: 12.4 G/DL — SIGNIFICANT CHANGE UP (ref 11.5–15.5)
INR BLD: 1.21 RATIO — HIGH (ref 0.85–1.18)
LYMPHOCYTES # BLD AUTO: 0.75 K/UL — LOW (ref 1–3.3)
LYMPHOCYTES # BLD AUTO: 23.3 % — SIGNIFICANT CHANGE UP (ref 13–44)
MAGNESIUM SERPL-MCNC: 1.9 MG/DL — SIGNIFICANT CHANGE UP (ref 1.6–2.6)
MANUAL SMEAR VERIFICATION: SIGNIFICANT CHANGE UP
MCHC RBC-ENTMCNC: 29.5 PG — SIGNIFICANT CHANGE UP (ref 27–34)
MCHC RBC-ENTMCNC: 32.9 GM/DL — SIGNIFICANT CHANGE UP (ref 32–36)
MCV RBC AUTO: 89.8 FL — SIGNIFICANT CHANGE UP (ref 80–100)
MONOCYTES # BLD AUTO: 0.56 K/UL — SIGNIFICANT CHANGE UP (ref 0–0.9)
MONOCYTES NFR BLD AUTO: 17.3 % — HIGH (ref 2–14)
NEUTROPHILS # BLD AUTO: 1.59 K/UL — LOW (ref 1.8–7.4)
NEUTROPHILS NFR BLD AUTO: 49.1 % — SIGNIFICANT CHANGE UP (ref 43–77)
PLAT MORPH BLD: NORMAL — SIGNIFICANT CHANGE UP
PLATELET # BLD AUTO: 85 K/UL — LOW (ref 150–400)
PROTHROM AB SERPL-ACNC: 12.6 SEC — SIGNIFICANT CHANGE UP (ref 9.5–13)
RBC # BLD: 4.2 M/UL — SIGNIFICANT CHANGE UP (ref 3.8–5.2)
RBC # FLD: 13.6 % — SIGNIFICANT CHANGE UP (ref 10.3–14.5)
RBC BLD AUTO: SIGNIFICANT CHANGE UP
WBC # BLD: 3.23 K/UL — LOW (ref 3.8–10.5)
WBC # FLD AUTO: 3.23 K/UL — LOW (ref 3.8–10.5)

## 2023-09-23 PROCEDURE — 99284 EMERGENCY DEPT VISIT MOD MDM: CPT | Mod: 25

## 2023-09-23 PROCEDURE — 93970 EXTREMITY STUDY: CPT

## 2023-09-23 PROCEDURE — 93970 EXTREMITY STUDY: CPT | Mod: 26

## 2023-09-23 PROCEDURE — 85025 COMPLETE CBC W/AUTO DIFF WBC: CPT

## 2023-09-23 PROCEDURE — 85610 PROTHROMBIN TIME: CPT

## 2023-09-23 PROCEDURE — 80053 COMPREHEN METABOLIC PANEL: CPT

## 2023-09-23 PROCEDURE — 85730 THROMBOPLASTIN TIME PARTIAL: CPT

## 2023-09-23 PROCEDURE — 83735 ASSAY OF MAGNESIUM: CPT

## 2023-09-23 PROCEDURE — 99284 EMERGENCY DEPT VISIT MOD MDM: CPT

## 2023-09-23 PROCEDURE — 82550 ASSAY OF CK (CPK): CPT

## 2023-09-23 PROCEDURE — 36415 COLL VENOUS BLD VENIPUNCTURE: CPT

## 2023-09-23 RX ORDER — SODIUM CHLORIDE 9 MG/ML
1000 INJECTION INTRAMUSCULAR; INTRAVENOUS; SUBCUTANEOUS ONCE
Refills: 0 | Status: COMPLETED | OUTPATIENT
Start: 2023-09-23 | End: 2023-09-23

## 2023-09-23 RX ADMIN — SODIUM CHLORIDE 1000 MILLILITER(S): 9 INJECTION INTRAMUSCULAR; INTRAVENOUS; SUBCUTANEOUS at 07:03

## 2023-09-23 NOTE — ED PROVIDER NOTE - ATTENDING CONTRIBUTION TO CARE
Attending (Tim Mack D.O.):  I have personally seen and examined this patient. I have performed a substantive portion of the visit including all aspects of the medical decision making. Resident, fellow, student, and/or ACP note reviewed. I agree on the plan of care except where noted.    58F hx of martin cirrhosis with MRI abd/pelvis done yesterday to see progression, breast cancer in past s/p chemo, chronic leg swelling, L > R, here for bilateral intermittent leg cramps, no pattern ongoing x 4 hrs, w/o assc trauma. no med changes. Deneis fever, chills. hemodynam stable, NAD, + mild bilateral LE ttp, R> L LLE swelling but nonpitting (baseline), 2+ distal pulses, full rom, steady gait, - striaght leg raise. + mild llq abd ttp, no cva ttp, no peritoneal signs. Eval for metabolic derrangement, do not think vte, no signs of infection. Plan for labs, hydration, reassess. -> patient signed out pending remainder of w/u, close reassessments, discussion of results, dispo.

## 2023-09-23 NOTE — ED PROVIDER NOTE - PROGRESS NOTE DETAILS
58-year-old female with history of Duran cirrhosis, prior history of breast cancer status posttreatment presents to the emergency department with lower extremity leg swelling.  Patient states that she recently came back from a trip approximately 1 month ago and then was hospitalized for diarrhea.  She comes into the hospital today with bilateral lower extremity leg cramps with left greater than right swelling.  Patient states that this may be her baseline however she usually does not have leg pain.  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  On exam patient has left greater than right leg swelling there is no redness nor warmth.  Patient to have blood work, imaging of the lower legs. Sallie KIDD PGY3: labs and imaging negative for emergent pathology at this time. sx improved. able to ambulate. safe for d/c with pcp f/u and strict return precautions.

## 2023-09-23 NOTE — ED PROVIDER NOTE - CLINICAL SUMMARY MEDICAL DECISION MAKING FREE TEXT BOX
58-year-old female with chronic leg cramping presents for worsening of symptoms that began last night after she was n.p.o. for the day for an MRI and felt dehydrated.  PERC negative.  No trauma recently.  Physical exam remarkable for bilateral lower extremity tenderness palpation.  Patient's presentation is most consistent with cramping pain due to dehydration.  Assess for electrolytes plus dehydration versus anemia.  Fluids.  And reassess.  Gavin Breaux, DO PGY1 58-year-old female with chronic leg cramping presents for worsening of symptoms that began last night after she was n.p.o. for the day for an MRI and felt dehydrated.  PERC negative.  No trauma recently.  Physical exam remarkable for bilateral lower extremity tenderness palpation.  Patient's presentation is most consistent with cramping pain due to dehydration.  Assess for electrolytes plus dehydration versus anemia.  Fluids.  And reassess.  Bel Breaux,  PGY1

## 2023-09-23 NOTE — ED PROVIDER NOTE - OBJECTIVE STATEMENT
58-year-old female past medical history of Duran cirrhosis and breast cancer presents for bilateral lower extremity cramping and pain that began at 2 AM this morning.  Patient states she is experienced this cramping pain on and off for years however yesterday she was n.p.o. for an abdomen pelvis MRI to evaluate progression of her liver disease and believes she became dehydrated and this may be why the pain began.  Denies fever, chills, history of blood clots, hemoptysis, recent cancer history of treatment, recent trauma or immobilization, weakness.

## 2023-09-23 NOTE — ED PROVIDER NOTE - PHYSICAL EXAMINATION
Bel Breaux DO (PGY1)   Physical Exam:    Gen: NAD, AOx3, non-toxic appearing, able to ambulate without assistance  Lung: CTAB, no respiratory distress, no wheezes/rhonchi/rales B/L  CV: RRR, no murmurs, rubs or gallops  MSK: bilat LE tenderness to palpation through the thigh and calf region. Patient baseline has LE edema and is unchanged. no erythema.  Neuro: intact motor and sensation

## 2023-09-23 NOTE — ED ADULT TRIAGE NOTE - BP NONINVASIVE DIASTOLIC (MM HG)
72 Detail Level: Zone Render In Strict Bullet Format?: No Discontinue Regimen: TAC .05% cream Plan: Should become more bothersome okay to send in Clobetasol .05% cream

## 2023-09-23 NOTE — ED PROVIDER NOTE - CCCP TRG CHIEF CMPLNT
Patient is scheduled for injections with Dr Adams on 1/22/18    CPT:  94481 (50 modifier) & 06781 (50 modifier)             Bilateral SI joint injections    DX:    Chronic bilateral low back pain without sciatica  - Primary M54.5, G89.29    Sacroiliac pain  M53.3           BLLE pain

## 2023-09-23 NOTE — ED PROVIDER NOTE - NSFOLLOWUPINSTRUCTIONS_ED_ALL_ED_FT
- You were seen for leg cramping.  Your ultrasounds did not show any blood clots.  Your electrolytes were normal.   Please follow-up with your primary care doctor in 7 to 10 days.    -Please take Tylenol up to 650 mg every 6 hours as needed for pain and/or Motrin up to 600 mg every 8 hours as needed for pain    -Please take any prescribed medications as instructed by your PMD    - Be sure to return to the ED if you develop new or worsening symptoms. Specific signs and symptoms to be vigilant of: fever or chills, chest pain, difficulty breathing, palpitations, loss of consciousness, headache, vision changes, slurred speech, difficulty swallowing or drooling, facial droop, weakness in the arms or legs, numbness or tingling, abdominal pain, nausea or vomiting, diarrhea, constipation, blood in the stool or urine, pain on urination, difficulty urinating.

## 2023-09-23 NOTE — ED PROVIDER NOTE - PATIENT PORTAL LINK FT
You can access the FollowMyHealth Patient Portal offered by [facility] by registering at the following website: [email or web address]. By joining [employer]’s FollowMyHealth portal, you will also be able to view your health information using other applications (apps) compatible with our system.

## 2023-09-23 NOTE — ED ADULT NURSE REASSESSMENT NOTE - NS ED NURSE REASSESS COMMENT FT1
Received patient from RN Jet, patient at baseline mental status, able to make needs known, NAD, VSS, patient agreeable to plan of care, comfort and safety provided.

## 2023-09-23 NOTE — ED ADULT TRIAGE NOTE - CHIEF COMPLAINT QUOTE
intermittent BLLE cramping x years. Describes as "contractions".   Took aleve without relief x 2am.   Pmhx: cirrhosis, breast CA.

## 2023-09-23 NOTE — ED ADULT NURSE NOTE - OBJECTIVE STATEMENT
58Y female, A&Ox4, pmh of cirrhosis, Breast CA, Asthma, DM. pt presents to the ED c/o leg cramping x2 days. pt states she was NPO for an mri of her abd and pelvic to check on her liver. pt states since being NPO she has had intermittent leg cramping worsening at night. pt denies f/n//v/d, headache, vision changes, cp, back pain, abd pain, numbness/tingling.

## 2023-09-23 NOTE — ED ADULT NURSE NOTE - NS ED NURSE DISCH DISPOSITION
Bedside and Verbal shift change report given to JOIE Polanco RN (oncoming nurse) by Sara Simms RN (offgoing nurse). Report included the following information SBAR, Kardex, Intake/Output, MAR, and Recent Results.      1300 cares done Discharged

## 2023-09-26 ENCOUNTER — APPOINTMENT (OUTPATIENT)
Dept: HEPATOLOGY | Facility: CLINIC | Age: 58
End: 2023-09-26

## 2023-09-28 ENCOUNTER — APPOINTMENT (OUTPATIENT)
Dept: GASTROENTEROLOGY | Facility: CLINIC | Age: 58
End: 2023-09-28

## 2023-10-06 ENCOUNTER — RX RENEWAL (OUTPATIENT)
Age: 58
End: 2023-10-06

## 2023-10-06 RX ORDER — FUROSEMIDE 20 MG/1
20 TABLET ORAL DAILY
Qty: 30 | Refills: 0 | Status: ACTIVE | COMMUNITY
Start: 2023-09-08 | End: 1900-01-01

## 2023-10-06 RX ORDER — SPIRONOLACTONE 50 MG/1
50 TABLET ORAL DAILY
Qty: 30 | Refills: 0 | Status: ACTIVE | COMMUNITY
Start: 2023-09-08 | End: 1900-01-01

## 2023-11-12 ENCOUNTER — NON-APPOINTMENT (OUTPATIENT)
Age: 58
End: 2023-11-12

## 2024-01-29 ENCOUNTER — APPOINTMENT (OUTPATIENT)
Dept: HEPATOLOGY | Facility: CLINIC | Age: 59
End: 2024-01-29

## 2024-04-22 ENCOUNTER — APPOINTMENT (OUTPATIENT)
Dept: VASCULAR SURGERY | Facility: CLINIC | Age: 59
End: 2024-04-22
Payer: COMMERCIAL

## 2024-04-22 VITALS
TEMPERATURE: 98 F | HEART RATE: 74 BPM | OXYGEN SATURATION: 99 % | BODY MASS INDEX: 28.56 KG/M2 | RESPIRATION RATE: 17 BRPM | WEIGHT: 182 LBS | DIASTOLIC BLOOD PRESSURE: 69 MMHG | SYSTOLIC BLOOD PRESSURE: 111 MMHG | HEIGHT: 67 IN

## 2024-04-22 PROCEDURE — 99203 OFFICE O/P NEW LOW 30 MIN: CPT

## 2024-04-22 PROCEDURE — 93970 EXTREMITY STUDY: CPT

## 2024-04-22 RX ORDER — DOXYCYCLINE HYCLATE 100 MG/1
100 TABLET ORAL
Qty: 20 | Refills: 0 | Status: DISCONTINUED | COMMUNITY
Start: 2023-11-14

## 2024-04-22 RX ORDER — NADOLOL 20 MG/1
20 TABLET ORAL
Qty: 30 | Refills: 0 | Status: DISCONTINUED | COMMUNITY
Start: 2023-10-10

## 2024-04-22 RX ORDER — OXYCODONE AND ACETAMINOPHEN 5; 325 MG/1; MG/1
5-325 TABLET ORAL
Qty: 10 | Refills: 0 | Status: DISCONTINUED | COMMUNITY
Start: 2023-11-14

## 2024-04-22 RX ORDER — METFORMIN ER 500 MG 500 MG/1
500 TABLET ORAL
Qty: 90 | Refills: 0 | Status: DISCONTINUED | COMMUNITY
Start: 2023-06-15

## 2024-04-22 RX ORDER — METFORMIN HYDROCHLORIDE 500 MG/1
500 TABLET, COATED ORAL
Qty: 30 | Refills: 0 | Status: ACTIVE | COMMUNITY
Start: 2023-10-31

## 2024-04-22 RX ORDER — EPINEPHRINE 0.3 MG/.3ML
0.3 INJECTION INTRAMUSCULAR
Qty: 6 | Refills: 0 | Status: ACTIVE | COMMUNITY
Start: 2024-02-06

## 2024-04-22 RX ORDER — IBUPROFEN 800 MG/1
800 TABLET, FILM COATED ORAL
Qty: 20 | Refills: 0 | Status: DISCONTINUED | COMMUNITY
Start: 2023-11-14

## 2024-04-22 RX ORDER — DENOSUMAB 60 MG/ML
INJECTION SUBCUTANEOUS
Refills: 0 | Status: ACTIVE | COMMUNITY

## 2024-04-22 RX ORDER — AZITHROMYCIN 250 MG/1
250 TABLET, FILM COATED ORAL
Qty: 6 | Refills: 0 | Status: DISCONTINUED | COMMUNITY
Start: 2024-04-16

## 2024-04-23 NOTE — HISTORY OF PRESENT ILLNESS
[FreeTextEntry1] : ARCHIE REZA is a 59 year old female who presents for evaluation of leg cramps.   Referred by Dr. Flores.   Patient states that she has had bilateral thigh cramps for the past year. She notices them only at nighttime but they are significant to wake her up from sleeping. She denies any leg pain with walking during the day. Has left leg swelling since over a year ago. She has had multiple venous duplex negative for DVT. Patient does take furosemide and spironolactone but without change in her leg swelling. Patient with worsening spider veins. She had a history of large right varicose veins s/p vein procedure several years ago. She has tried compression, leg elevation, and ambulation for the past year without relief in her symptoms.  Personal history of DVT - None Family history of DVT - None Family history of venous insufficiency or varicose veins - mother, grandmother has varicose veins Current compression stocking usage - Wears thigh high compression stockings with no change in symptoms Has 2 children Works as an ultrasound tech  + PMH: Diabetes, asthma, DCIS s/p lumpectomy, thrombocytopenia (sees heme regularly) + PSH: s/p cholecystectomy, s/p appendectomy, s/p , s/p lumpectomy  + FH: diabetes, HTN + SH: never smoker, no etoh use, no illicit substance use + Aller: augmentin (diarrhea)  PCP is Dr. Amanda Finnegan.

## 2024-04-23 NOTE — DATA REVIEWED
[FreeTextEntry1] : 4/22/2024 - BLE venous duplex Negative for DVT/SVT bilaterally. Right - reflux in GSV measuring 10.9 mm at junction, 9.1 mm prox, reflux > 0.5 seconds, reflux extends to distal calf GSV. reflux in SSV measuring 5.9 mm at junction, 5.7 mm prox, reflux > 0.5 seconds.  Left - reflux in GSV measuring 8.6 mm at junction, 6.6 mm prox, reflux > 0.5 seconds, reflux extends up to distal calf. Reflux in SSV measuring 5.5 mm at junction, 5.5 mm prox, reflux > 0.5 seconds. VV measuring up to 4.7 mm.

## 2024-04-23 NOTE — ASSESSMENT
[FreeTextEntry1] : Aleena Chaparro is a 59 year old woman presenting for evaluation.   > Venous insufficiency, CEAP C3 - Reviewed results of duplex. Significant bilateral venous insufficiency.  - She has failed medical management despite strict adherence to using compression stockings (20-30 mmhg), leg elevation, ambulation, avoidance of prolonged sitting or standing. Her symptoms are affecting her ADLs. She is a candidate for bilateral GSV RFA, distal GSV varithena, SSV RFA, stab phlebectomy. Risks, benefits, alternatives discussed with the patient. All questions answered. The patient elected to proceed with procedure. Will schedule patient.  - For symptom management, recommend use of compression stockings (20-30 mmhg), leg elevation, and ambulation.

## 2024-05-17 RX ORDER — ALPRAZOLAM 0.5 MG/1
0.5 TABLET ORAL
Qty: 1 | Refills: 0 | Status: COMPLETED | COMMUNITY
Start: 2024-05-17 | End: 2024-05-21

## 2024-05-17 RX ORDER — SODIUM BICARBONATE 84 MG/ML
8.4 INJECTION, SOLUTION INTRAVENOUS
Qty: 5 | Refills: 0 | Status: COMPLETED | COMMUNITY
Start: 2024-05-17 | End: 2024-05-21

## 2024-05-17 RX ORDER — LIDOCAINE HYDROCHLORIDE 10 MG/ML
1 INJECTION, SOLUTION INFILTRATION; PERINEURAL
Qty: 50 | Refills: 0 | Status: COMPLETED | COMMUNITY
Start: 2024-05-17 | End: 2024-05-21

## 2024-05-21 ENCOUNTER — APPOINTMENT (OUTPATIENT)
Dept: VASCULAR SURGERY | Facility: CLINIC | Age: 59
End: 2024-05-21
Payer: COMMERCIAL

## 2024-05-21 PROCEDURE — 36475 ENDOVENOUS RF 1ST VEIN: CPT | Mod: LT

## 2024-05-21 NOTE — PROCEDURE
[FreeTextEntry1] : left GSV RFA   [FreeTextEntry3] : Procedural safety checklist and time out completed: Confirmed patient identification (Patient Name, , and/or medical record number including, when possible, affirmation by patient or parent/family/other). Confirmed procedure with the patient. Consent present, accurate and signed. Confirmed special equipment and supplies are present. Sterility confirmed. Position verified. Site/ side is marked and visible and confirmed. Procedure confirmed by consent. Accurate consent including side and site. Review of medical records, including venous ultrasound, noting correct procedure including site and side. MD/PA verifies presence and review of imaging studies and or written report of imaging studies. Agreement on the procedure to be performed. Time out completed. All of the above has been confirmed by the team. All patient-specific concerns have been addressed.   Indication: left lower extremity varicose veins with inflammation, leg pain, leg swelling, and leg cramping.  Venous insufficiency/ reflux.   Procedure: radiofrequency ablation of the left great saphenous vein.   Ms. ARCHIE REZA is a 59 year old F with a history of left lower extremity varicose veins previously seen in the office.  Ultrasound examination demonstrated venous insufficiency. A trial of compression stockings, exercise, elevation, and pain medication was attempted without relief and definitive treatment with radiofrequency ablation was offered.   The patient has come for radiofrequency ablation treatment of the left great saphenous vein. I have discussed the risks of the procedure at length with the patient. The risks discussed were inclusive of but not limited to infection, irritation at the site of infiltration of local anesthesia, and also rare risk of deep venous thrombosis and pulmonary emboli. The patient agrees to proceed with the procedure. The patient was escorted into the procedure room and a time out called. The entire limb was prepped and draped in sterile fashion. The RF fiber was placed on the sterile field and connected by a sterile cable. Actuation, temperature, and impedance testing were performed to ensure that all components were connected and operating properly. The patient was placed on the procedure table and local anesthesia was instilled in the skin overlying the access site. Under ultrasound guidance, the vein was punctured with a micropuncture needle, using the anterior wall technique. A guide wire was now introduced through the needle, and the needle was then exchanged over the guide wire for a 7F sheath. The guide wire was removed, and the RF probe was then placed into the saphenous vein through the sheath and position confirmed using ultrasound guidance. After the RF probe position was verified by ultrasound, tumescent anesthesia consisting of normal saline, 1% lidocaine with 8.4% sodium bicarbonate was infiltrated, under ultrasound guidance, precisely into the perivenous compartment along the entire length of the vein until a halo of fluid was noted around the vein. After RF probe position was again confirmed with ultrasound imaging, RF energy was applied. The probe was gradually and carefully withdrawn at a rate of 6.5cm/20seconds.   6 cycles of RF performed using the _7 cm probe. Total treatment time was 120 seconds. The total volume injected was 200 cc. Treatment length was 33 cm and The probe is 3.7 cm from the SFJ.   Estimated Blood Loss: minimal. Repeat ultrasound of the treated vein was performed confirming successful treatment. The catheter and sheath were withdrawn, and hemostasis established with direct pressure. After assuring hemostasis, a sterile 4x4 was placed on the access site and an ACE compression wrap was applied. Patient tolerated procedure well. Patient was given post-procedure instructions and follow up appointment was scheduled.

## 2024-05-28 ENCOUNTER — APPOINTMENT (OUTPATIENT)
Dept: VASCULAR SURGERY | Facility: CLINIC | Age: 59
End: 2024-05-28
Payer: COMMERCIAL

## 2024-05-28 PROCEDURE — 93971 EXTREMITY STUDY: CPT | Mod: LT

## 2024-06-05 RX ORDER — SODIUM BICARBONATE 84 MG/ML
8.4 INJECTION, SOLUTION INTRAVENOUS
Qty: 5 | Refills: 0 | Status: COMPLETED | COMMUNITY
Start: 2024-06-05 | End: 2024-06-11

## 2024-06-05 RX ORDER — LIDOCAINE HYDROCHLORIDE 10 MG/ML
1 INJECTION, SOLUTION INFILTRATION; PERINEURAL
Qty: 50 | Refills: 0 | Status: COMPLETED | COMMUNITY
Start: 2024-06-05 | End: 2024-06-11

## 2024-06-05 RX ORDER — ALPRAZOLAM 0.5 MG/1
0.5 TABLET ORAL
Qty: 1 | Refills: 0 | Status: COMPLETED | COMMUNITY
Start: 2024-06-05 | End: 2024-06-11

## 2024-06-11 ENCOUNTER — APPOINTMENT (OUTPATIENT)
Dept: VASCULAR SURGERY | Facility: CLINIC | Age: 59
End: 2024-06-11
Payer: COMMERCIAL

## 2024-06-11 PROCEDURE — 36475 ENDOVENOUS RF 1ST VEIN: CPT | Mod: RT

## 2024-06-11 NOTE — PROCEDURE
[FreeTextEntry1] : right GSV RFA [FreeTextEntry3] : Procedural safety checklist and time out completed: Confirmed patient identification (Patient Name, , and/or medical record number including, when possible, affirmation by patient or parent/family/other). Confirmed procedure with the patient. Consent present, accurate and signed. Confirmed special equipment and supplies are present. Sterility confirmed. Position verified. Site/ side is marked and visible and confirmed. Procedure confirmed by consent. Accurate consent including side and site. Review of medical records, including venous ultrasound, noting correct procedure including site and side. MD/PA verifies presence and review of imaging studies and or written report of imaging studies. Agreement on the procedure to be performed. Time out completed. All of the above has been confirmed by the team. All patient-specific concerns have been addressed.   Indication: right lower extremity varicose veins with inflammation, leg pain, leg swelling, and leg cramping.  Venous insufficiency/ reflux.   Procedure: radiofrequency ablation of the right great saphenous vein.   Ms. ARCHIE REZA is a 59 year old F with a history of right lower extremity varicose veins previously seen in the office.  Ultrasound examination demonstrated venous insufficiency. A trial of compression stockings, exercise, elevation, and pain medication was attempted without relief and definitive treatment with radiofrequency ablation was offered.   The patient has come for radiofrequency ablation treatment of the right great saphenous vein. I have discussed the risks of the procedure at length with the patient. The risks discussed were inclusive of but not limited to infection, irritation at the site of infiltration of local anesthesia, and also rare risk of deep venous thrombosis and pulmonary emboli. The patient agrees to proceed with the procedure. The patient was escorted into the procedure room and a time out called. The entire limb was prepped and draped in sterile fashion. The RF fiber was placed on the sterile field and connected by a sterile cable. Actuation, temperature, and impedance testing were performed to ensure that all components were connected and operating properly. The patient was placed on the procedure table and local anesthesia was instilled in the skin overlying the access site. Under ultrasound guidance, the vein was punctured with a micropuncture needle, using the anterior wall technique. A guide wire was now introduced through the needle, and the needle was then exchanged over the guide wire for a 7F sheath. The guide wire was removed, and the RF probe was then placed into the saphenous vein through the sheath and position confirmed using ultrasound guidance. After the RF probe position was verified by ultrasound, tumescent anesthesia consisting of normal saline, 1% lidocaine with 8.4% sodium bicarbonate was infiltrated, under ultrasound guidance, precisely into the perivenous compartment along the entire length of the vein until a halo of fluid was noted around the vein. After RF probe position was again confirmed with ultrasound imaging, RF energy was applied. The probe was gradually and carefully withdrawn at a rate of 6.5cm/20seconds.   9 cycles of RF performed using the _7 cm probe. Total treatment time was 180 seconds. The total volume injected was 400 cc. Treatment length was 46 cm and The probe is 3.6 cm from the SFJ.   Estimated Blood Loss: minimal. Repeat ultrasound of the treated vein was performed confirming successful treatment. The catheter and sheath were withdrawn, and hemostasis established with direct pressure. After assuring hemostasis, a sterile 4x4 was placed on the access site and an ACE compression wrap was applied. Patient tolerated procedure well. Patient was given post-procedure instructions and follow up appointment was scheduled.

## 2024-06-14 RX ORDER — ALPRAZOLAM 0.5 MG/1
0.5 TABLET ORAL
Qty: 1 | Refills: 0 | Status: COMPLETED | COMMUNITY
Start: 2024-06-14 | End: 2024-06-18

## 2024-06-17 ENCOUNTER — APPOINTMENT (OUTPATIENT)
Dept: VASCULAR SURGERY | Facility: CLINIC | Age: 59
End: 2024-06-17
Payer: COMMERCIAL

## 2024-06-17 PROCEDURE — 93971 EXTREMITY STUDY: CPT | Mod: RT

## 2024-06-18 ENCOUNTER — APPOINTMENT (OUTPATIENT)
Dept: VASCULAR SURGERY | Facility: CLINIC | Age: 59
End: 2024-06-18
Payer: COMMERCIAL

## 2024-06-18 DIAGNOSIS — I83.893 VARICOSE VEINS OF BILATERAL LOWER EXTREMITIES WITH OTHER COMPLICATIONS: ICD-10-CM

## 2024-06-18 PROCEDURE — 36465Z: CUSTOM | Mod: LT

## 2024-06-18 PROCEDURE — 36482Z ENDOVEN THER CHEM ADHES 1ST: CUSTOM | Mod: LT

## 2024-06-18 NOTE — PROCEDURE
[FreeTextEntry1] : left GSV Varithena  [FreeTextEntry3] : Procedural safety checklist and time out completed: Confirmed patient identification (Patient Name, , and/or medical record number including when possible affirmation by patient or parent/family/other). Confirmed procedure with the patient. Consent present, accurate and signed. Confirmed special equipment and supplies are present. Sterility confirmed. Position verified. Site/ side is marked and visible and confirmed. Procedure confirmed by consent. Accurate consent including side and site. Review of medical records, including venous ultrasound, noting correct procedure including site and side. MD/PA verifies presence and review of imaging studies and or written report of imaging studies. Agreement on the procedure to be performed Time out completed. All of the above has been confirmed by the team. All patient-specific concerns have been addressed.   Indication: left lower extremity varicose veins with inflammation, leg pain, leg swelling, and leg cramping.  Venous insufficiency/ reflux.   Procedure: Ultrasound guided microfoam chemical ablation with Varithena of the left distal great saphenous vein   Ms. ARCHIE REZA is a 59 year old F with a history of left lower extremity varicose veins and venous insufficiency previously seen in the office.  Ultrasound examination demonstrated venous insufficiency. A trial of compression stockings, exercise, elevation, and pain medication was attempted without relief and definitive treatment with radiofrequency ablation was offered.   The patient has come for ultrasound guided microfoam chemical ablation with Varithena of the left distal great saphenous vein I have discussed the risks of the procedure at length with the patient. The risks discussed were inclusive of but not limited to infection, irritation at the site of infiltration of local anesthesia, and also rare risk of deep venous thrombosis and pulmonary emboli. The patient agrees to proceed with the procedure. The patient was escorted into the procedure room and a time out called.   The patient was placed on the procedure room table and was evaluated in reverse Trendelenburg position. The leg was prepped and sterile drapes applied. 1.0% lidocaine was administered at the chosen access site for local anesthesia. The vein was accessed using a 4F micro-puncture needle followed by a guide wire through the punctured needle and dilator sheath, standard IV catheters, or butterfly needle under advanced ultrasound guidance. Vein access was established and confirmed by aspiration of dark low pressure blood. After gaining access at the ankle , the leg was positioned at 45 degrees of elevation in relationship to the torso. The Varithena canister was primed and purged as required by the instructions. A 1 mL aliquot was drawn into a sterile syringe then attached to the access device.   Varithena was administered at 1.0 cc per second with close observation under ultrasound in its course of the GSV. The vein was observed for spasm under ultrasound, once vein was in full spasm the administration of Varithena was stopped   After the administration of Varithena the patient was asked to dorsiflex the ankle to limit flow of foam into perforating veins. Once appropriate spasm had been confirmed in the treated veins, the access device was removed from the leg and light pressure was applied to the puncture site for hemostasis.   The common femoral and deep superficial veins were then evaluated for flow and compressibility prior to dressing placement. The lower extremity was kept elevated at 45 degree angle and a multilayer dressing was applied including an under layer, compression pad, and thigh high 20-30 mmHg compression stocking to the patient. The leg was lowered only after compression had been applied.   The patient ambulated 10 minutes under supervision and was without concerns at time of release. The patient was instructed to take an anti-inflammatory medicine as needed and to follow up for duplex scan of treated vein.   Patient tolerated procedure, was given post-procedure instructions and a follow-up appt scheduled. Post-care instructions include walking, wearing compression during the day, taking off only to shower and then reapplying for two weeks, advising patient to keep post-treatment bandages in place and dry for 48 hours, avoid extended periods of inactivity, avoid heavy exercise for one week, to walk daily for 10 minutes over the next month. The patient was instructed to take an anti-inflammatory medicine as needed. Serial # [id number] exp 2024

## 2024-06-24 ENCOUNTER — NON-APPOINTMENT (OUTPATIENT)
Age: 59
End: 2024-06-24

## 2024-06-25 ENCOUNTER — APPOINTMENT (OUTPATIENT)
Dept: VASCULAR SURGERY | Facility: CLINIC | Age: 59
End: 2024-06-25
Payer: COMMERCIAL

## 2024-06-25 PROCEDURE — 93971 EXTREMITY STUDY: CPT | Mod: LT

## 2024-07-09 ENCOUNTER — APPOINTMENT (OUTPATIENT)
Dept: VASCULAR SURGERY | Facility: CLINIC | Age: 59
End: 2024-07-09

## 2024-07-15 ENCOUNTER — APPOINTMENT (OUTPATIENT)
Dept: VASCULAR SURGERY | Facility: CLINIC | Age: 59
End: 2024-07-15

## 2024-07-16 ENCOUNTER — APPOINTMENT (OUTPATIENT)
Dept: VASCULAR SURGERY | Facility: CLINIC | Age: 59
End: 2024-07-16

## 2024-07-23 ENCOUNTER — APPOINTMENT (OUTPATIENT)
Dept: VASCULAR SURGERY | Facility: CLINIC | Age: 59
End: 2024-07-23

## 2024-07-24 ENCOUNTER — APPOINTMENT (OUTPATIENT)
Dept: VASCULAR SURGERY | Facility: CLINIC | Age: 59
End: 2024-07-24
Payer: COMMERCIAL

## 2024-07-24 VITALS
BODY MASS INDEX: 32.44 KG/M2 | HEART RATE: 74 BPM | WEIGHT: 190 LBS | SYSTOLIC BLOOD PRESSURE: 111 MMHG | DIASTOLIC BLOOD PRESSURE: 77 MMHG | OXYGEN SATURATION: 98 % | TEMPERATURE: 97.8 F | HEIGHT: 64 IN

## 2024-07-24 DIAGNOSIS — I83.893 VARICOSE VEINS OF BILATERAL LOWER EXTREMITIES WITH OTHER COMPLICATIONS: ICD-10-CM

## 2024-07-24 PROCEDURE — 99214 OFFICE O/P EST MOD 30 MIN: CPT

## 2024-07-24 NOTE — HISTORY OF PRESENT ILLNESS
[FreeTextEntry1] : ARCHIE REZA is a 59 year old female who presents for evaluation of leg cramps.   Referred by Dr. Flores.   Patient states that she has had bilateral thigh cramps for the past year. She notices them only at nighttime but they are significant to wake her up from sleeping. She denies any leg pain with walking during the day. Has left leg swelling since over a year ago. She has had multiple venous duplex negative for DVT. Patient does take furosemide and spironolactone but without change in her leg swelling. Patient with worsening spider veins. She had a history of large right varicose veins s/p vein procedure several years ago. She has tried compression, leg elevation, and ambulation for the past year without relief in her symptoms.  Personal history of DVT - None Family history of DVT - None Family history of venous insufficiency or varicose veins - mother, grandmother has varicose veins Current compression stocking usage - Wears thigh high compression stockings with no change in symptoms Has 2 children Works as an ultrasound tech  + PMH: Diabetes, asthma, DCIS s/p lumpectomy, thrombocytopenia (sees heme regularly) + PSH: s/p cholecystectomy, s/p appendectomy, s/p , s/p lumpectomy  + FH: diabetes, HTN + SH: never smoker, no etoh use, no illicit substance use + Aller: augmentin (diarrhea)  PCP is Dr. Amanda Finneagn.  [de-identified] : 5/21/2024 - LEFT GSV RFA 6/11/2024 - RIGHT GSV RFA 6/18/2024 - LEFT DISTAL GSV VARITHENA  7/24/2024 - Presents for follow up. Doing well post procedures. Reports that her thigh cramping has resolved. She also is having decreased leg swelling. Legs are feeling well. Has a few procedures still scheduled. Duplex after left distal GSV varithena shows patent distal GSV, likely from large .

## 2024-07-24 NOTE — ASSESSMENT
[FreeTextEntry1] : Aleena Chaparro is a 59 year old woman presenting for evaluation.   > Venous insufficiency, CEAP C3 - Reviewed results of duplex. Significant bilateral venous insufficiency.  - s/p bilateral GSV RFA, left distal GSV varithena 5-6/2024.  - Left distal GSV remains patent but as patient's leg symptoms have improved, no need for retreatment at this time.  - Patient initially scheduled for right distal GSV varithena, bilateral SSV RFA, and stab phlebectomy but had to postpone procedures due to work schedule conflict. She would like to now proceed with procedures. Will schedule procedures.  - For symptom management, recommend use of compression stockings (20-30 mmhg), leg elevation, and ambulation.

## 2024-07-24 NOTE — PHYSICAL EXAM
[2+] : left 2+ [Ankle Swelling (On Exam)] : present [Ankle Swelling On The Left] : moderate [Varicose Veins Of Lower Extremities] : present [Ankle Swelling Bilaterally] : severe [Calm] : calm [] : not present [de-identified] : Well-appearing  [de-identified] : EOMI, anicteric  [de-identified] : soft, nt, nd  [de-identified] : moves all extremities [de-identified] : significant spider veins and reticular veins bilaterally, incisions sites have healed well.  [de-identified] : A&Ox4

## 2024-08-13 ENCOUNTER — APPOINTMENT (OUTPATIENT)
Dept: VASCULAR SURGERY | Facility: CLINIC | Age: 59
End: 2024-08-13

## 2024-08-20 ENCOUNTER — APPOINTMENT (OUTPATIENT)
Dept: VASCULAR SURGERY | Facility: CLINIC | Age: 59
End: 2024-08-20

## 2024-09-03 RX ORDER — ALPRAZOLAM 0.5 MG/1
0.5 TABLET ORAL
Qty: 1 | Refills: 0 | Status: COMPLETED | COMMUNITY
Start: 2024-09-03 | End: 2024-09-10

## 2024-09-10 ENCOUNTER — APPOINTMENT (OUTPATIENT)
Dept: VASCULAR SURGERY | Facility: CLINIC | Age: 59
End: 2024-09-10

## 2024-09-10 ENCOUNTER — APPOINTMENT (OUTPATIENT)
Dept: VASCULAR SURGERY | Facility: CLINIC | Age: 59
End: 2024-09-10
Payer: COMMERCIAL

## 2024-09-10 PROCEDURE — 36465Z: CUSTOM | Mod: RT

## 2024-09-10 NOTE — PROCEDURE
[FreeTextEntry1] : right GSV Varithena [FreeTextEntry3] : Procedural safety checklist and time out completed: Confirmed patient identification (Patient Name, , and/or medical record number including when possible affirmation by patient or parent/family/other). Confirmed procedure with the patient. Consent present, accurate and signed. Confirmed special equipment and supplies are present. Sterility confirmed. Position verified. Site/ side is marked and visible and confirmed. Procedure confirmed by consent. Accurate consent including side and site. Review of medical records, including venous ultrasound, noting correct procedure including site and side. MD/PA verifies presence and review of imaging studies and or written report of imaging studies. Agreement on the procedure to be performed Time out completed. All of the above has been confirmed by the team. All patient-specific concerns have been addressed.   Indication: right lower extremity varicose veins with inflammation, leg pain, leg swelling, and leg cramping.  Venous insufficiency/ reflux.   Procedure: Ultrasound guided microfoam chemical ablation with Varithena of the right distal great saphenous vein   Ms. ARCHIE REZA is a 59 year old F with a history of right lower extremity varicose veins and venous insufficiency previously seen in the office.  Ultrasound examination demonstrated venous insufficiency. A trial of compression stockings, exercise, elevation, and pain medication was attempted without relief and definitive treatment with radiofrequency ablation was offered.   The patient has come for ultrasound guided microfoam chemical ablation with Varithena of the right distal great saphenous vein I have discussed the risks of the procedure at length with the patient. The risks discussed were inclusive of but not limited to infection, irritation at the site of infiltration of local anesthesia, and also rare risk of deep venous thrombosis and pulmonary emboli. The patient agrees to proceed with the procedure. The patient was escorted into the procedure room and a time out called.   The patient was placed on the procedure room table and was evaluated in reverse Trendelenburg position. The leg was prepped and sterile drapes applied. 1.0% lidocaine was administered at the chosen access site for local anesthesia. The vein was accessed using a 4F micro-puncture needle followed by a guide wire through the punctured needle and dilator sheath, standard IV catheters, or butterfly needle under advanced ultrasound guidance. Vein access was established and confirmed by aspiration of dark low pressure blood. After gaining access at _ , the leg was positioned at 45 degrees of elevation in relationship to the torso. The Varithena canister was primed and purged as required by the instructions. A _ mL aliquot was drawn into a sterile syringe then attached to the access device.   Varithena was administered at 1.0 cc per second with close observation under ultrasound in its course of the GSV  at the ankle__. The vein was observed for spasm under ultrasound, once vein was in full spasm the administration of Varithena was stopped   After the administration of Varithena the patient was asked to dorsiflex the ankle to limit flow of foam into perforating veins. Once appropriate spasm had been confirmed in the treated veins, the access device was removed from the leg and light pressure was applied to the puncture site for hemostasis.   The common femoral and deep superficial veins were then evaluated for flow and compressibility prior to dressing placement. The lower extremity was kept elevated at 45 degree angle and a multilayer dressing was applied including an under layer, compression pad, and thigh high 20-30 mmHg compression stocking to the patient. The leg was lowered only after compression had been applied.   The patient ambulated 10 minutes under supervision and was without concerns at time of release. The patient was instructed to take an anti-inflammatory medicine as needed and to follow up for duplex scan of treated vein.   Patient tolerated procedure, was given post-procedure instructions and a follow-up appt scheduled. Post-care instructions include walking, wearing compression during the day, taking off only to shower and then reapplying for two weeks, advising patient to keep post-treatment bandages in place and dry for 48 hours, avoid extended periods of inactivity, avoid heavy exercise for one week, to walk daily for 10 minutes over the next month. The patient was instructed to take an anti-inflammatory medicine as needed.   Serial # [id number] exp 2025

## 2024-09-10 NOTE — PROCEDURE
[FreeTextEntry1] : right GSV Varithena [FreeTextEntry3] : Procedural safety checklist and time out completed: Confirmed patient identification (Patient Name, , and/or medical record number including when possible affirmation by patient or parent/family/other). Confirmed procedure with the patient. Consent present, accurate and signed. Confirmed special equipment and supplies are present. Sterility confirmed. Position verified. Site/ side is marked and visible and confirmed. Procedure confirmed by consent. Accurate consent including side and site. Review of medical records, including venous ultrasound, noting correct procedure including site and side. MD/PA verifies presence and review of imaging studies and or written report of imaging studies. Agreement on the procedure to be performed Time out completed. All of the above has been confirmed by the team. All patient-specific concerns have been addressed.   Indication: right lower extremity varicose veins with inflammation, leg pain, leg swelling, and leg cramping.  Venous insufficiency/ reflux.   Procedure: Ultrasound guided microfoam chemical ablation with Varithena of the right distal great saphenous vein   Ms. ARCHIE REZA is a 59 year old F with a history of right lower extremity varicose veins and venous insufficiency previously seen in the office.  Ultrasound examination demonstrated venous insufficiency. A trial of compression stockings, exercise, elevation, and pain medication was attempted without relief and definitive treatment with radiofrequency ablation was offered.   The patient has come for ultrasound guided microfoam chemical ablation with Varithena of the right distal great saphenous vein I have discussed the risks of the procedure at length with the patient. The risks discussed were inclusive of but not limited to infection, irritation at the site of infiltration of local anesthesia, and also rare risk of deep venous thrombosis and pulmonary emboli. The patient agrees to proceed with the procedure. The patient was escorted into the procedure room and a time out called.   The patient was placed on the procedure room table and was evaluated in reverse Trendelenburg position. The leg was prepped and sterile drapes applied. 1.0% lidocaine was administered at the chosen access site for local anesthesia. The vein was accessed using a 4F micro-puncture needle followed by a guide wire through the punctured needle and dilator sheath, standard IV catheters, or butterfly needle under advanced ultrasound guidance. Vein access was established and confirmed by aspiration of dark low pressure blood. After gaining access at _ , the leg was positioned at 45 degrees of elevation in relationship to the torso. The Varithena canister was primed and purged as required by the instructions. A _ mL aliquot was drawn into a sterile syringe then attached to the access device.   Varithena was administered at 1.0 cc per second with close observation under ultrasound in its course of the GSV  at the ankle__. The vein was observed for spasm under ultrasound, once vein was in full spasm the administration of Varithena was stopped   After the administration of Varithena the patient was asked to dorsiflex the ankle to limit flow of foam into perforating veins. Once appropriate spasm had been confirmed in the treated veins, the access device was removed from the leg and light pressure was applied to the puncture site for hemostasis.   The common femoral and deep superficial veins were then evaluated for flow and compressibility prior to dressing placement. The lower extremity was kept elevated at 45 degree angle and a multilayer dressing was applied including an under layer, compression pad, and thigh high 20-30 mmHg compression stocking to the patient. The leg was lowered only after compression had been applied.   The patient ambulated 10 minutes under supervision and was without concerns at time of release. The patient was instructed to take an anti-inflammatory medicine as needed and to follow up for duplex scan of treated vein.   Patient tolerated procedure, was given post-procedure instructions and a follow-up appt scheduled. Post-care instructions include walking, wearing compression during the day, taking off only to shower and then reapplying for two weeks, advising patient to keep post-treatment bandages in place and dry for 48 hours, avoid extended periods of inactivity, avoid heavy exercise for one week, to walk daily for 10 minutes over the next month. The patient was instructed to take an anti-inflammatory medicine as needed.   Serial # [id number] exp 2025

## 2024-09-11 DIAGNOSIS — I83.893 VARICOSE VEINS OF BILATERAL LOWER EXTREMITIES WITH OTHER COMPLICATIONS: ICD-10-CM

## 2024-09-11 RX ORDER — SODIUM BICARBONATE 84 MG/ML
8.4 INJECTION, SOLUTION INTRAVENOUS
Qty: 5 | Refills: 0 | Status: COMPLETED | COMMUNITY
Start: 2024-09-11 | End: 2024-09-17

## 2024-09-11 RX ORDER — LIDOCAINE HYDROCHLORIDE 10 MG/ML
1 INJECTION, SOLUTION INFILTRATION; PERINEURAL
Qty: 50 | Refills: 0 | Status: COMPLETED | COMMUNITY
Start: 2024-09-11 | End: 2024-09-17

## 2024-09-11 RX ORDER — ALPRAZOLAM 0.5 MG/1
0.5 TABLET ORAL
Qty: 1 | Refills: 0 | Status: COMPLETED | COMMUNITY
Start: 2024-09-11 | End: 2024-09-17

## 2024-09-20 ENCOUNTER — NON-APPOINTMENT (OUTPATIENT)
Age: 59
End: 2024-09-20

## 2024-09-20 ENCOUNTER — APPOINTMENT (OUTPATIENT)
Dept: VASCULAR SURGERY | Facility: CLINIC | Age: 59
End: 2024-09-20

## 2024-09-27 ENCOUNTER — APPOINTMENT (OUTPATIENT)
Dept: VASCULAR SURGERY | Facility: CLINIC | Age: 59
End: 2024-09-27

## 2024-09-27 PROCEDURE — 93971 EXTREMITY STUDY: CPT | Mod: RT

## 2024-10-01 RX ORDER — LIDOCAINE HYDROCHLORIDE 10 MG/ML
1 INJECTION, SOLUTION INFILTRATION; PERINEURAL
Qty: 500 | Refills: 0 | Status: COMPLETED | COMMUNITY
Start: 2024-10-01 | End: 2024-10-08

## 2024-10-01 RX ORDER — ALPRAZOLAM 0.5 MG/1
0.5 TABLET ORAL
Qty: 1 | Refills: 0 | Status: COMPLETED | COMMUNITY
Start: 2024-10-01 | End: 2024-10-08

## 2024-10-08 ENCOUNTER — APPOINTMENT (OUTPATIENT)
Dept: VASCULAR SURGERY | Facility: CLINIC | Age: 59
End: 2024-10-08
Payer: COMMERCIAL

## 2024-10-08 PROCEDURE — 36475 ENDOVENOUS RF 1ST VEIN: CPT | Mod: LT

## 2024-10-10 DIAGNOSIS — I83.893 VARICOSE VEINS OF BILATERAL LOWER EXTREMITIES WITH OTHER COMPLICATIONS: ICD-10-CM

## 2024-10-10 RX ORDER — ALPRAZOLAM 0.5 MG/1
0.5 TABLET ORAL
Qty: 1 | Refills: 0 | Status: COMPLETED | COMMUNITY
Start: 2024-10-10 | End: 2024-10-15

## 2024-10-10 RX ORDER — LIDOCAINE HYDROCHLORIDE 10 MG/ML
1 INJECTION, SOLUTION INFILTRATION; PERINEURAL
Qty: 500 | Refills: 0 | Status: COMPLETED | COMMUNITY
Start: 2024-10-10 | End: 2024-10-15

## 2024-10-15 ENCOUNTER — APPOINTMENT (OUTPATIENT)
Dept: VASCULAR SURGERY | Facility: CLINIC | Age: 59
End: 2024-10-15
Payer: COMMERCIAL

## 2024-10-15 PROCEDURE — 93971 EXTREMITY STUDY: CPT | Mod: LT,59

## 2024-10-15 PROCEDURE — 36475 ENDOVENOUS RF 1ST VEIN: CPT | Mod: RT

## 2024-10-22 ENCOUNTER — APPOINTMENT (OUTPATIENT)
Dept: VASCULAR SURGERY | Facility: CLINIC | Age: 59
End: 2024-10-22

## 2024-11-07 DIAGNOSIS — I83.893 VARICOSE VEINS OF BILATERAL LOWER EXTREMITIES WITH OTHER COMPLICATIONS: ICD-10-CM

## 2024-11-07 RX ORDER — ALPRAZOLAM 0.5 MG/1
0.5 TABLET ORAL
Qty: 1 | Refills: 0 | Status: COMPLETED | COMMUNITY
Start: 2024-11-07 | End: 2024-11-12

## 2024-11-11 ENCOUNTER — NON-APPOINTMENT (OUTPATIENT)
Age: 59
End: 2024-11-11

## 2024-11-12 ENCOUNTER — APPOINTMENT (OUTPATIENT)
Dept: VASCULAR SURGERY | Facility: CLINIC | Age: 59
End: 2024-11-12

## 2024-11-19 ENCOUNTER — APPOINTMENT (OUTPATIENT)
Dept: VASCULAR SURGERY | Facility: CLINIC | Age: 59
End: 2024-11-19

## 2024-12-11 ENCOUNTER — APPOINTMENT (OUTPATIENT)
Dept: VASCULAR SURGERY | Facility: CLINIC | Age: 59
End: 2024-12-11
Payer: COMMERCIAL

## 2024-12-11 VITALS
SYSTOLIC BLOOD PRESSURE: 122 MMHG | DIASTOLIC BLOOD PRESSURE: 77 MMHG | HEIGHT: 64 IN | OXYGEN SATURATION: 98 % | WEIGHT: 190 LBS | BODY MASS INDEX: 32.44 KG/M2 | TEMPERATURE: 97.9 F | HEART RATE: 70 BPM

## 2024-12-11 DIAGNOSIS — I83.893 VARICOSE VEINS OF BILATERAL LOWER EXTREMITIES WITH OTHER COMPLICATIONS: ICD-10-CM

## 2024-12-11 PROCEDURE — 99213 OFFICE O/P EST LOW 20 MIN: CPT

## 2025-03-18 NOTE — ED PROVIDER NOTE - NS ED ATTENDING STATEMENT MOD
The Dx on the script is for chronic shoulder pain which is how it is described in the notes. Pt's plan has denied this medication and we have no grounds for appeal without new information.     Thank you   Attending with

## 2025-05-05 ENCOUNTER — APPOINTMENT (OUTPATIENT)
Dept: VASCULAR SURGERY | Facility: CLINIC | Age: 60
End: 2025-05-05
Payer: COMMERCIAL

## 2025-05-05 ENCOUNTER — NON-APPOINTMENT (OUTPATIENT)
Age: 60
End: 2025-05-05

## 2025-05-05 VITALS
RESPIRATION RATE: 16 BRPM | HEIGHT: 64 IN | HEART RATE: 82 BPM | WEIGHT: 190 LBS | TEMPERATURE: 98.4 F | DIASTOLIC BLOOD PRESSURE: 83 MMHG | SYSTOLIC BLOOD PRESSURE: 136 MMHG | OXYGEN SATURATION: 99 % | BODY MASS INDEX: 32.44 KG/M2

## 2025-05-05 DIAGNOSIS — I87.2 VENOUS INSUFFICIENCY (CHRONIC) (PERIPHERAL): ICD-10-CM

## 2025-05-05 PROCEDURE — 99213 OFFICE O/P EST LOW 20 MIN: CPT

## 2025-05-05 PROCEDURE — 93971 EXTREMITY STUDY: CPT

## 2025-05-21 ENCOUNTER — APPOINTMENT (OUTPATIENT)
Dept: ORTHOPEDIC SURGERY | Facility: CLINIC | Age: 60
End: 2025-05-21

## 2025-05-22 ENCOUNTER — APPOINTMENT (OUTPATIENT)
Dept: ORTHOPEDIC SURGERY | Facility: CLINIC | Age: 60
End: 2025-05-22
Payer: COMMERCIAL

## 2025-05-22 VITALS — WEIGHT: 190 LBS | HEIGHT: 64 IN | BODY MASS INDEX: 32.44 KG/M2

## 2025-05-22 DIAGNOSIS — S83.282A OTHER TEAR OF LATERAL MENISCUS, CURRENT INJURY, LEFT KNEE, INITIAL ENCOUNTER: ICD-10-CM

## 2025-05-22 DIAGNOSIS — S83.242A OTHER TEAR OF MEDIAL MENISCUS, CURRENT INJURY, LEFT KNEE, INITIAL ENCOUNTER: ICD-10-CM

## 2025-05-22 DIAGNOSIS — M17.12 UNILATERAL PRIMARY OSTEOARTHRITIS, LEFT KNEE: ICD-10-CM

## 2025-05-22 PROCEDURE — 99204 OFFICE O/P NEW MOD 45 MIN: CPT

## 2025-06-02 ENCOUNTER — APPOINTMENT (OUTPATIENT)
Dept: ORTHOPEDIC SURGERY | Facility: AMBULATORY SURGERY CENTER | Age: 60
End: 2025-06-02

## 2025-06-11 ENCOUNTER — APPOINTMENT (OUTPATIENT)
Dept: ORTHOPEDIC SURGERY | Facility: CLINIC | Age: 60
End: 2025-06-11

## 2025-07-28 ENCOUNTER — APPOINTMENT (OUTPATIENT)
Dept: ORTHOPEDIC SURGERY | Facility: AMBULATORY SURGERY CENTER | Age: 60
End: 2025-07-28

## 2025-09-09 ENCOUNTER — APPOINTMENT (OUTPATIENT)
Dept: ORTHOPEDIC SURGERY | Facility: CLINIC | Age: 60
End: 2025-09-09
Payer: COMMERCIAL

## 2025-09-09 VITALS — BODY MASS INDEX: 32.44 KG/M2 | WEIGHT: 190 LBS | HEIGHT: 64 IN

## 2025-09-09 DIAGNOSIS — S83.242A OTHER TEAR OF MEDIAL MENISCUS, CURRENT INJURY, LEFT KNEE, INITIAL ENCOUNTER: ICD-10-CM

## 2025-09-09 DIAGNOSIS — M17.12 UNILATERAL PRIMARY OSTEOARTHRITIS, LEFT KNEE: ICD-10-CM

## 2025-09-09 DIAGNOSIS — S83.282A OTHER TEAR OF LATERAL MENISCUS, CURRENT INJURY, LEFT KNEE, INITIAL ENCOUNTER: ICD-10-CM

## 2025-09-09 PROCEDURE — 99213 OFFICE O/P EST LOW 20 MIN: CPT
